# Patient Record
Sex: FEMALE | Race: WHITE | Employment: OTHER | URBAN - NONMETROPOLITAN AREA
[De-identification: names, ages, dates, MRNs, and addresses within clinical notes are randomized per-mention and may not be internally consistent; named-entity substitution may affect disease eponyms.]

---

## 2020-09-26 ENCOUNTER — HOSPITAL ENCOUNTER (INPATIENT)
Age: 85
LOS: 10 days | Discharge: NURSING, REHAB OR LONG-TERM CARE | DRG: 377 | End: 2020-10-06
Attending: HOSPITALIST | Admitting: INTERNAL MEDICINE
Payer: MEDICARE

## 2020-09-26 PROBLEM — K92.2 UPPER GASTROINTESTINAL BLEED: Status: ACTIVE | Noted: 2020-09-26

## 2020-09-26 LAB
ALBUMIN SERPL-MCNC: 3.4 G/DL (ref 3.5–5.2)
ALP BLD-CCNC: 51 U/L (ref 35–104)
ALT SERPL-CCNC: 9 U/L (ref 5–33)
ANION GAP SERPL CALCULATED.3IONS-SCNC: 16 MMOL/L (ref 7–19)
APTT: 33 SEC (ref 26–36.2)
AST SERPL-CCNC: 21 U/L (ref 5–32)
BASOPHILS ABSOLUTE: 0.1 K/UL (ref 0–0.2)
BASOPHILS RELATIVE PERCENT: 0.5 % (ref 0–1)
BILIRUB SERPL-MCNC: 0.4 MG/DL (ref 0.2–1.2)
BUN BLDV-MCNC: 35 MG/DL (ref 8–23)
CALCIUM SERPL-MCNC: 8.7 MG/DL (ref 8.2–9.6)
CHLORIDE BLD-SCNC: 91 MMOL/L (ref 98–111)
CO2: 24 MMOL/L (ref 22–29)
CREAT SERPL-MCNC: 0.6 MG/DL (ref 0.5–0.9)
EOSINOPHILS ABSOLUTE: 0.1 K/UL (ref 0–0.6)
EOSINOPHILS RELATIVE PERCENT: 0.9 % (ref 0–5)
FERRITIN: 353.7 NG/ML (ref 13–150)
GFR AFRICAN AMERICAN: >59
GFR NON-AFRICAN AMERICAN: >60
GLUCOSE BLD-MCNC: 99 MG/DL (ref 74–109)
HAPTOGLOBIN: 146 MG/DL (ref 30–200)
HCT VFR BLD CALC: 27.6 % (ref 37–47)
HEMOGLOBIN: 9.6 G/DL (ref 12–16)
IMMATURE GRANULOCYTES #: 0.1 K/UL
INR BLD: 1.16 (ref 0.88–1.18)
LACTATE DEHYDROGENASE: 219 U/L (ref 91–215)
LACTIC ACID: 1.2 MMOL/L (ref 0.5–1.9)
LYMPHOCYTES ABSOLUTE: 2.5 K/UL (ref 1.1–4.5)
LYMPHOCYTES RELATIVE PERCENT: 19.6 % (ref 20–40)
MAGNESIUM: 1.8 MG/DL (ref 1.7–2.3)
MCH RBC QN AUTO: 31.2 PG (ref 27–31)
MCHC RBC AUTO-ENTMCNC: 34.8 G/DL (ref 33–37)
MCV RBC AUTO: 89.6 FL (ref 81–99)
MONOCYTES ABSOLUTE: 1.3 K/UL (ref 0–0.9)
MONOCYTES RELATIVE PERCENT: 10.6 % (ref 0–10)
NEUTROPHILS ABSOLUTE: 8.6 K/UL (ref 1.5–7.5)
NEUTROPHILS RELATIVE PERCENT: 68 % (ref 50–65)
PDW BLD-RTO: 12.3 % (ref 11.5–14.5)
PHOSPHORUS: 2.4 MG/DL (ref 2.5–4.5)
PLATELET # BLD: 281 K/UL (ref 130–400)
PMV BLD AUTO: 9.7 FL (ref 9.4–12.3)
POTASSIUM REFLEX MAGNESIUM: 4 MMOL/L (ref 3.5–5)
PRO-BNP: 205 PG/ML (ref 0–1800)
PROTHROMBIN TIME: 14.8 SEC (ref 12–14.6)
RBC # BLD: 3.08 M/UL (ref 4.2–5.4)
RETICULOCYTE ABSOLUTE COUNT: 0.05 M/UL (ref 0.03–0.12)
RETICULOCYTE COUNT PCT: 1.61 % (ref 0.5–1.5)
SODIUM BLD-SCNC: 131 MMOL/L (ref 136–145)
TOTAL PROTEIN: 6.7 G/DL (ref 6.6–8.7)
TROPONIN: <0.01 NG/ML (ref 0–0.03)
WBC # BLD: 12.6 K/UL (ref 4.8–10.8)

## 2020-09-26 PROCEDURE — C9113 INJ PANTOPRAZOLE SODIUM, VIA: HCPCS | Performed by: HOSPITALIST

## 2020-09-26 PROCEDURE — 2580000003 HC RX 258: Performed by: HOSPITALIST

## 2020-09-26 PROCEDURE — 6360000002 HC RX W HCPCS: Performed by: HOSPITALIST

## 2020-09-26 PROCEDURE — 2000000000 HC ICU R&B

## 2020-09-26 RX ORDER — ONDANSETRON 4 MG/1
4 TABLET, ORALLY DISINTEGRATING ORAL EVERY 8 HOURS PRN
Status: DISCONTINUED | OUTPATIENT
Start: 2020-09-26 | End: 2020-10-06 | Stop reason: HOSPADM

## 2020-09-26 RX ORDER — MELOXICAM 15 MG/1
15 TABLET ORAL DAILY
Status: ON HOLD | COMMUNITY
End: 2020-10-06 | Stop reason: HOSPADM

## 2020-09-26 RX ORDER — OCTREOTIDE ACETATE 50 UG/ML
50 INJECTION, SOLUTION INTRAVENOUS; SUBCUTANEOUS ONCE
Status: COMPLETED | OUTPATIENT
Start: 2020-09-26 | End: 2020-09-26

## 2020-09-26 RX ORDER — PANTOPRAZOLE SODIUM 40 MG/10ML
40 INJECTION, POWDER, LYOPHILIZED, FOR SOLUTION INTRAVENOUS DAILY
Status: DISCONTINUED | OUTPATIENT
Start: 2020-09-29 | End: 2020-09-29

## 2020-09-26 RX ORDER — ASPIRIN 81 MG/1
81 TABLET ORAL DAILY
Status: ON HOLD | COMMUNITY
End: 2020-10-06 | Stop reason: HOSPADM

## 2020-09-26 RX ORDER — PANTOPRAZOLE SODIUM 40 MG/1
40 TABLET, DELAYED RELEASE ORAL DAILY
Status: ON HOLD | COMMUNITY
End: 2020-10-06 | Stop reason: HOSPADM

## 2020-09-26 RX ORDER — SODIUM CHLORIDE 0.9 % (FLUSH) 0.9 %
10 SYRINGE (ML) INJECTION PRN
Status: DISCONTINUED | OUTPATIENT
Start: 2020-09-26 | End: 2020-10-06 | Stop reason: HOSPADM

## 2020-09-26 RX ORDER — ACETAMINOPHEN 650 MG/1
650 SUPPOSITORY RECTAL EVERY 6 HOURS PRN
Status: DISCONTINUED | OUTPATIENT
Start: 2020-09-26 | End: 2020-10-06 | Stop reason: HOSPADM

## 2020-09-26 RX ORDER — ONDANSETRON 2 MG/ML
4 INJECTION INTRAMUSCULAR; INTRAVENOUS EVERY 6 HOURS PRN
Status: DISCONTINUED | OUTPATIENT
Start: 2020-09-26 | End: 2020-10-06 | Stop reason: HOSPADM

## 2020-09-26 RX ORDER — SODIUM CHLORIDE 9 MG/ML
10 INJECTION INTRAVENOUS DAILY
Status: DISCONTINUED | OUTPATIENT
Start: 2020-09-29 | End: 2020-09-29

## 2020-09-26 RX ORDER — SODIUM CHLORIDE 0.9 % (FLUSH) 0.9 %
10 SYRINGE (ML) INJECTION EVERY 12 HOURS SCHEDULED
Status: DISCONTINUED | OUTPATIENT
Start: 2020-09-26 | End: 2020-10-06 | Stop reason: HOSPADM

## 2020-09-26 RX ORDER — INDAPAMIDE 1.25 MG/1
1.25 TABLET, FILM COATED ORAL NIGHTLY
Status: ON HOLD | COMMUNITY
End: 2020-10-06 | Stop reason: HOSPADM

## 2020-09-26 RX ORDER — ACETAMINOPHEN 325 MG/1
650 TABLET ORAL EVERY 6 HOURS PRN
Status: DISCONTINUED | OUTPATIENT
Start: 2020-09-26 | End: 2020-10-06 | Stop reason: HOSPADM

## 2020-09-26 RX ORDER — BENAZEPRIL HYDROCHLORIDE 20 MG/1
20 TABLET ORAL NIGHTLY
COMMUNITY

## 2020-09-26 RX ADMIN — SODIUM CHLORIDE 8 MG/HR: 9 INJECTION, SOLUTION INTRAVENOUS at 23:37

## 2020-09-26 RX ADMIN — SODIUM CHLORIDE, PRESERVATIVE FREE 10 ML: 5 INJECTION INTRAVENOUS at 23:37

## 2020-09-26 RX ADMIN — OCTREOTIDE ACETATE 50 MCG: 50 INJECTION, SOLUTION INTRAVENOUS; SUBCUTANEOUS at 23:38

## 2020-09-26 RX ADMIN — OCTREOTIDE ACETATE 25 MCG/HR: 500 INJECTION, SOLUTION INTRAVENOUS; SUBCUTANEOUS at 23:37

## 2020-09-26 SDOH — HEALTH STABILITY: MENTAL HEALTH: HOW OFTEN DO YOU HAVE A DRINK CONTAINING ALCOHOL?: NEVER

## 2020-09-26 NOTE — H&P
Patient Information:  Patient: Leona Ness  MRN: [id number]   [place]: [de-identified]  YOB: 1929  Admit Date: 9/26/2020       Primary Care Physician: Edgar York MD  Advance Directive: Full Code   HEALTH CARE PROXY: her son is her [facility] [facility]. PoA, Mr Stephania Lopes, [phone] (cellular) , [phone] (home)        SUBJECTIVE:    EP Sign Out:  Referral from [facility], Dr. Carrie Dhaliwal  Patient: Soledad Daly, 80year old lady  Referral for: Upper GIB needing ICU Care  CC: epigastric pain x weeks  Dark stools x days  Hb was 14.5 earlier this year  Today 10.4  Stools melenic  BUN 41  Cr 0.8  110s over 60s  On protonix GGT  Only med is low-dose ASA    HPI:  Mrs. Elida Nicole is a pleasant 80year old lady with severe hardness of hearing. She had has dark green stools for the last few weeks, but had a melenic stool today, it turned the water red however. She has never had this before. Her children states that they suspect she had an ulcer about 35-40 years ago. She never smoked and rarely drinks, she has been told to drink 2 ounces of red wine for her macular degeneration by her eye doctor but has not been doing this. Her son states that she has barely eaten in the last month, she just has a few bites. She will take at most 1/4 can of soup now or a while egg on some mornings. They had taken her to an OSH a few days ago and they gave her a script for protonix. The PA in the ED apparently stated to them that she might have an \"impaction or a mass in the stomach\" based on her imaging but nothing other than the script was offered for Tx. Has lost 30 pounds over the last year. Review of Systems:   Review of Systems   Constitutional: Positive for appetite change, fatigue and unexpected weight change. Negative for chills, diaphoresis and fever. Respiratory: Negative for chest tightness and shortness of breath. Cardiovascular: Negative for chest pain and palpitations.    Gastrointestinal: Positive for abdominal pain and nausea. Negative for vomiting. Loss of taste   Neurological: Negative for dizziness, syncope and light-headedness. Psychiatric/Behavioral: Positive for confusion.      Past Medical History:   Diagnosis Date    Acid reflux     Arthritis     Hypertension    macular Degeneration - Dry Type  OA of knees    Past Psychiatric History:  Denies any    Past Surgical History:   Procedure Laterality Date    ANKLE SURGERY Right     CARPAL TUNNEL RELEASE      CHOLECYSTECTOMY      JOINT REPLACEMENT      TOTAL HIP ARTHROPLASTY      TUBAL LIGATION       Social History     Tobacco History     Smoking Status  Former Smoker    Smokeless Tobacco Use  Never Used          Alcohol History     Alcohol Use Status  sipof wine once and a while          Drug Use     Drug Use Status  Never          Sexual Activity     Sexually Active  Has 9 kids            CODE STATUS: DNR-CCA  HEALTH CARE PROXY: her [facility] is her [facility]. PoA, Mr Jeannie Betancur, [phone] (cellular) , [phone] (home)  AMBULATES: with walker  DOMICILED: lives alone has a care giver there during the days    Family History   Problem Relation Age of Onset    Diabetes Mother     Cancer Father         prostate metastatic    Cancer Sister         breast    Glaucoma Son     Elevated Lipids Son     Arthritis Daughter     Elevated Lipids Daughter      Allergies:  No Known Allergies    Current Facility-Administered Medications   Medication Dose Route Frequency Provider Last Rate Last Dose    sodium chloride flush 0.9 % injection 10 mL  10 mL Intravenous 2 times per day Mauricio Villanueva MD   10 mL at 09/26/20 3483    sodium chloride flush 0.9 % injection 10 mL  10 mL Intravenous PRN Mauricio Villanueva MD        acetaminophen (TYLENOL) tablet 650 mg  650 mg Oral Q6H PRN Mauricio Villanueva MD        Or    acetaminophen (TYLENOL) suppository 650 mg  650 mg Rectal Q6H PRN Mauricio Villanueva MD        ondansetron (ZOFRAN-ODT) disintegrating acute distress. Appearance: Normal appearance. She is normal weight. She is not ill-appearing or toxic-appearing. HENT:      Head: Normocephalic and atraumatic. Nose: No congestion or rhinorrhea. Eyes:      General:         Right eye: No discharge. Left eye: No discharge. Neck:      Musculoskeletal: Neck supple. Comments: Trachea appears midline  Cardiovascular:      Rate and Rhythm: Normal rate and regular rhythm. Heart sounds: No murmur. No friction rub. No gallop. Pulmonary:      Effort: No respiratory distress. Breath sounds: No stridor. No wheezing, rhonchi or rales. Chest:      Chest wall: No tenderness. Abdominal:      General: Bowel sounds are normal.      Palpations: Abdomen is soft. Tenderness: There is generalized abdominal tenderness. There is no guarding or rebound. Musculoskeletal:      Comments: Decreased muscle mass, decreased fat mass   Skin:     General: Skin is warm. Comments: nondiaphoretic   Neurological:      Mental Status: She is alert. Comments: Oriented to person and place and day of week  But thought it was the 2nd of [email or web address]   Psychiatric:         Mood and Affect: Mood normal.         Behavior: Behavior normal.       LABORATORY DATA:    CBC:   Recent Labs     09/26/20 2314   WBC 12.6*   HGB 9.6*   HCT 27.6*        BMP:   Recent Labs     09/26/20 2314   *   K 4.0   CL 91*   CO2 24   BUN 35*   CREATININE 0.6   CALCIUM 8.7   PHOS 2.4*     Hepatic Profile:   Recent Labs     09/26/20 2314   AST 21   ALT 9   BILITOT 0.4   ALKPHOS 51     Coag Panel:   Recent Labs     09/26/20 2314   INR 1.16   PROTIME 14.8*   APTT 33.0     Cardiac Enzymes:   Recent Labs     09/26/20 2314   TROPONINI <0.01     Pro-BNP:   Recent Labs     09/26/20 2314   PROBNP 205     A1C: No results for input(s): LABA1C in the last 72 hours.     TSH: Invalid input(s): LABTSH    ABG: No results for input(s): PHART, PLX9UHG, PO2ART, ANV4IUY,

## 2020-09-27 ENCOUNTER — ANESTHESIA (OUTPATIENT)
Dept: ENDOSCOPY | Age: 85
DRG: 377 | End: 2020-09-27
Payer: MEDICARE

## 2020-09-27 ENCOUNTER — ANESTHESIA EVENT (OUTPATIENT)
Dept: ENDOSCOPY | Age: 85
DRG: 377 | End: 2020-09-27
Payer: MEDICARE

## 2020-09-27 VITALS — SYSTOLIC BLOOD PRESSURE: 134 MMHG | OXYGEN SATURATION: 92 % | DIASTOLIC BLOOD PRESSURE: 62 MMHG

## 2020-09-27 LAB
ANION GAP SERPL CALCULATED.3IONS-SCNC: 13 MMOL/L (ref 7–19)
BUN BLDV-MCNC: 30 MG/DL (ref 8–23)
CALCIUM SERPL-MCNC: 8.7 MG/DL (ref 8.2–9.6)
CHLORIDE BLD-SCNC: 94 MMOL/L (ref 98–111)
CO2: 25 MMOL/L (ref 22–29)
CREAT SERPL-MCNC: 0.7 MG/DL (ref 0.5–0.9)
GFR AFRICAN AMERICAN: >59
GFR NON-AFRICAN AMERICAN: >60
GLUCOSE BLD-MCNC: 133 MG/DL (ref 74–109)
HCT VFR BLD CALC: 28.7 % (ref 37–47)
HCT VFR BLD CALC: 28.7 % (ref 37–47)
HCT VFR BLD CALC: 28.8 % (ref 37–47)
HCT VFR BLD CALC: 29.9 % (ref 37–47)
HCT VFR BLD CALC: 30.4 % (ref 37–47)
HEMOGLOBIN: 10.1 G/DL (ref 12–16)
HEMOGLOBIN: 10.1 G/DL (ref 12–16)
HEMOGLOBIN: 9.7 G/DL (ref 12–16)
HEMOGLOBIN: 9.8 G/DL (ref 12–16)
HEMOGLOBIN: 9.8 G/DL (ref 12–16)
IRON SATURATION: 32 % (ref 14–50)
IRON: 76 UG/DL (ref 37–145)
MCH RBC QN AUTO: 30.9 PG (ref 27–31)
MCHC RBC AUTO-ENTMCNC: 33.8 G/DL (ref 33–37)
MCV RBC AUTO: 91.4 FL (ref 81–99)
PDW BLD-RTO: 12.3 % (ref 11.5–14.5)
PLATELET # BLD: 284 K/UL (ref 130–400)
PMV BLD AUTO: 9.5 FL (ref 9.4–12.3)
POTASSIUM REFLEX MAGNESIUM: 3.6 MMOL/L (ref 3.5–5)
RBC # BLD: 3.14 M/UL (ref 4.2–5.4)
SARS-COV-2, NAAT: DETECTED
SODIUM BLD-SCNC: 132 MMOL/L (ref 136–145)
TOTAL IRON BINDING CAPACITY: 235 UG/DL (ref 250–400)
WBC # BLD: 11 K/UL (ref 4.8–10.8)

## 2020-09-27 PROCEDURE — 6360000002 HC RX W HCPCS: Performed by: INTERNAL MEDICINE

## 2020-09-27 PROCEDURE — 6370000000 HC RX 637 (ALT 250 FOR IP): Performed by: INTERNAL MEDICINE

## 2020-09-27 PROCEDURE — 83735 ASSAY OF MAGNESIUM: CPT

## 2020-09-27 PROCEDURE — 83615 LACTATE (LD) (LDH) ENZYME: CPT

## 2020-09-27 PROCEDURE — 2580000003 HC RX 258: Performed by: HOSPITALIST

## 2020-09-27 PROCEDURE — 99221 1ST HOSP IP/OBS SF/LOW 40: CPT | Performed by: INTERNAL MEDICINE

## 2020-09-27 PROCEDURE — 83010 ASSAY OF HAPTOGLOBIN QUANT: CPT

## 2020-09-27 PROCEDURE — C9113 INJ PANTOPRAZOLE SODIUM, VIA: HCPCS | Performed by: INTERNAL MEDICINE

## 2020-09-27 PROCEDURE — 85045 AUTOMATED RETICULOCYTE COUNT: CPT

## 2020-09-27 PROCEDURE — 83540 ASSAY OF IRON: CPT

## 2020-09-27 PROCEDURE — 0DJ08ZZ INSPECTION OF UPPER INTESTINAL TRACT, VIA NATURAL OR ARTIFICIAL OPENING ENDOSCOPIC: ICD-10-PCS | Performed by: INTERNAL MEDICINE

## 2020-09-27 PROCEDURE — 80053 COMPREHEN METABOLIC PANEL: CPT

## 2020-09-27 PROCEDURE — 6360000002 HC RX W HCPCS: Performed by: HOSPITALIST

## 2020-09-27 PROCEDURE — 83550 IRON BINDING TEST: CPT

## 2020-09-27 PROCEDURE — 85610 PROTHROMBIN TIME: CPT

## 2020-09-27 PROCEDURE — 2100000000 HC CCU R&B

## 2020-09-27 PROCEDURE — 36415 COLL VENOUS BLD VENIPUNCTURE: CPT

## 2020-09-27 PROCEDURE — 84100 ASSAY OF PHOSPHORUS: CPT

## 2020-09-27 PROCEDURE — U0002 COVID-19 LAB TEST NON-CDC: HCPCS

## 2020-09-27 PROCEDURE — 85018 HEMOGLOBIN: CPT

## 2020-09-27 PROCEDURE — 85014 HEMATOCRIT: CPT

## 2020-09-27 PROCEDURE — 6360000002 HC RX W HCPCS: Performed by: ANESTHESIOLOGY

## 2020-09-27 PROCEDURE — C9113 INJ PANTOPRAZOLE SODIUM, VIA: HCPCS | Performed by: HOSPITALIST

## 2020-09-27 PROCEDURE — 43235 EGD DIAGNOSTIC BRUSH WASH: CPT | Performed by: INTERNAL MEDICINE

## 2020-09-27 PROCEDURE — 84484 ASSAY OF TROPONIN QUANT: CPT

## 2020-09-27 PROCEDURE — 2580000003 HC RX 258: Performed by: INTERNAL MEDICINE

## 2020-09-27 PROCEDURE — 82728 ASSAY OF FERRITIN: CPT

## 2020-09-27 PROCEDURE — 85027 COMPLETE CBC AUTOMATED: CPT

## 2020-09-27 PROCEDURE — 83880 ASSAY OF NATRIURETIC PEPTIDE: CPT

## 2020-09-27 PROCEDURE — 83605 ASSAY OF LACTIC ACID: CPT

## 2020-09-27 PROCEDURE — 2500000003 HC RX 250 WO HCPCS: Performed by: ANESTHESIOLOGY

## 2020-09-27 PROCEDURE — 85025 COMPLETE CBC W/AUTO DIFF WBC: CPT

## 2020-09-27 PROCEDURE — 85730 THROMBOPLASTIN TIME PARTIAL: CPT

## 2020-09-27 RX ORDER — ERGOCALCIFEROL 1.25 MG/1
50000 CAPSULE ORAL WEEKLY
Status: DISCONTINUED | OUTPATIENT
Start: 2020-09-27 | End: 2020-10-06 | Stop reason: HOSPADM

## 2020-09-27 RX ORDER — LIDOCAINE HYDROCHLORIDE 10 MG/ML
INJECTION, SOLUTION INFILTRATION; PERINEURAL PRN
Status: DISCONTINUED | OUTPATIENT
Start: 2020-09-27 | End: 2020-09-29 | Stop reason: SDUPTHER

## 2020-09-27 RX ORDER — SUCRALFATE 1 G/1
1 TABLET ORAL EVERY 6 HOURS SCHEDULED
Status: DISCONTINUED | OUTPATIENT
Start: 2020-09-27 | End: 2020-10-06 | Stop reason: HOSPADM

## 2020-09-27 RX ORDER — METOPROLOL TARTRATE 5 MG/5ML
2.5 INJECTION INTRAVENOUS ONCE
Status: COMPLETED | OUTPATIENT
Start: 2020-09-28 | End: 2020-09-28

## 2020-09-27 RX ORDER — PROPOFOL 10 MG/ML
INJECTION, EMULSION INTRAVENOUS PRN
Status: DISCONTINUED | OUTPATIENT
Start: 2020-09-27 | End: 2020-09-29 | Stop reason: SDUPTHER

## 2020-09-27 RX ORDER — ZINC SULFATE 50(220)MG
50 CAPSULE ORAL DAILY
Status: DISCONTINUED | OUTPATIENT
Start: 2020-09-27 | End: 2020-10-06 | Stop reason: HOSPADM

## 2020-09-27 RX ORDER — CHOLECALCIFEROL (VITAMIN D3) 125 MCG
10 CAPSULE ORAL DAILY
Status: DISCONTINUED | OUTPATIENT
Start: 2020-09-27 | End: 2020-10-06 | Stop reason: HOSPADM

## 2020-09-27 RX ORDER — MONTELUKAST SODIUM 10 MG/1
10 TABLET ORAL NIGHTLY
Status: DISCONTINUED | OUTPATIENT
Start: 2020-09-27 | End: 2020-10-06 | Stop reason: HOSPADM

## 2020-09-27 RX ADMIN — OCTREOTIDE ACETATE 25 MCG/HR: 500 INJECTION, SOLUTION INTRAVENOUS; SUBCUTANEOUS at 19:07

## 2020-09-27 RX ADMIN — PROPOFOL 50 MG: 10 INJECTION, EMULSION INTRAVENOUS at 14:50

## 2020-09-27 RX ADMIN — ERGOCALCIFEROL 50000 UNITS: 1.25 CAPSULE ORAL at 10:53

## 2020-09-27 RX ADMIN — Medication 10 MG: at 10:52

## 2020-09-27 RX ADMIN — LIDOCAINE HYDROCHLORIDE 3 ML: 10 INJECTION, SOLUTION INFILTRATION; PERINEURAL at 14:50

## 2020-09-27 RX ADMIN — AZITHROMYCIN MONOHYDRATE 500 MG: 500 INJECTION, POWDER, LYOPHILIZED, FOR SOLUTION INTRAVENOUS at 14:12

## 2020-09-27 RX ADMIN — SODIUM CHLORIDE 8 MG/HR: 9 INJECTION, SOLUTION INTRAVENOUS at 07:38

## 2020-09-27 RX ADMIN — SUCRALFATE 1 G: 1 TABLET ORAL at 11:55

## 2020-09-27 RX ADMIN — MONTELUKAST SODIUM 10 MG: 10 TABLET, FILM COATED ORAL at 19:07

## 2020-09-27 RX ADMIN — SODIUM CHLORIDE, PRESERVATIVE FREE 10 ML: 5 INJECTION INTRAVENOUS at 07:38

## 2020-09-27 RX ADMIN — OCTREOTIDE ACETATE 25 MCG/HR: 500 INJECTION, SOLUTION INTRAVENOUS; SUBCUTANEOUS at 14:12

## 2020-09-27 RX ADMIN — SODIUM CHLORIDE 8 MG/HR: 9 INJECTION, SOLUTION INTRAVENOUS at 19:07

## 2020-09-27 RX ADMIN — SUCRALFATE 1 G: 1 TABLET ORAL at 17:20

## 2020-09-27 RX ADMIN — Medication 5000 UNITS: at 10:52

## 2020-09-27 RX ADMIN — SODIUM CHLORIDE, PRESERVATIVE FREE 10 ML: 5 INJECTION INTRAVENOUS at 19:09

## 2020-09-27 SDOH — HEALTH STABILITY: MENTAL HEALTH: HOW OFTEN DO YOU HAVE A DRINK CONTAINING ALCOHOL?: NOT ASKED

## 2020-09-27 ASSESSMENT — ENCOUNTER SYMPTOMS
NAUSEA: 0
BACK PAIN: 0
DIARRHEA: 0
CONSTIPATION: 0
NAUSEA: 1
CHEST TIGHTNESS: 0
VOMITING: 0
ABDOMINAL PAIN: 1
COUGH: 0
SHORTNESS OF BREATH: 0

## 2020-09-27 ASSESSMENT — LIFESTYLE VARIABLES: SMOKING_STATUS: 0

## 2020-09-27 NOTE — PROGRESS NOTES
1131 Grazyna Mobley is being assessed upon: Admission    I agree that Madeleine Doll, along with Elly Lee (either 2 RN's or 1 LPN and 1 RN) have performed a thorough Head to Toe Skin Assessment on the patient. ALL assessment sites listed below have been assessed. Areas assessed by both nurses:     [x]   Head, Face, and Ears   [x]   Shoulders, Back, and Chest  [x]   Arms, Elbows, and Hands   [x]   Coccyx, Sacrum, and Ischium  [x]   Legs, Feet, and Heels    Does the Patient have Skin Breakdown? Yes, wound(s) noted upon assessment. It is the responsibility of the Primary Nurse to assure that the following documentation, preventions, orders, and consults are complete on the above noted wound(s): Wound LDA initiated. LDA Flowsheet Documentation includes the Lola-wound, Wound Assessment, Measurements, Dressing Treatment, Drainage, and Color.     Ousmane Prevention initiated: Yes  Wound Care Orders initiated: No    WOC nurse consulted for Pressure Injury (Stage 3,4, Unstageable, DTI, NWPT, and Complex wounds) and New or Established Ostomies: No        Primary Nurse eSignature: Anita Gonzalez RN on 9/26/2020 at 11:54 PM      Co-Signer eSignature: {Esignature:[id number]}

## 2020-09-27 NOTE — PLAN OF CARE
Problem: Falls - Risk of:  Goal: Will remain free from falls  Description: Will remain free from falls  9/27/2020 1556 by Jace Hickman RN  Outcome: Met This Shift  9/27/2020 0641 by Cely Thakur RN  Outcome: Met This Shift  Goal: Absence of physical injury  Description: Absence of physical injury  9/27/2020 1556 by Jace Hickman RN  Outcome: Met This Shift  9/27/2020 0641 by Cely Thakur RN  Outcome: Met This Shift     Problem: Airway Clearance - Ineffective  Goal: Achieve or maintain patent airway  Outcome: Met This Shift     Problem: Gas Exchange - Impaired  Goal: Absence of hypoxia  Outcome: Met This Shift  Goal: Promote optimal lung function  Outcome: Met This Shift     Problem: Breathing Pattern - Ineffective  Goal: Ability to achieve and maintain a regular respiratory rate  Outcome: Met This Shift     Problem:  Body Temperature -  Risk of, Imbalanced  Goal: Ability to maintain a body temperature within defined limits  Outcome: Met This Shift  Goal: Will regain or maintain usual level of consciousness  Outcome: Met This Shift  Goal: Complications related to the disease process, condition or treatment will be avoided or minimized  Outcome: Met This Shift     Problem: Risk for Fluid Volume Deficit  Goal: Maintain normal heart rhythm  Outcome: Met This Shift  Goal: Maintain absence of muscle cramping  Outcome: Met This Shift  Goal: Maintain normal serum potassium, sodium, calcium, phosphorus, and pH  Outcome: Met This Shift     Problem: Skin Integrity:  Goal: Will show no infection signs and symptoms  Description: Will show no infection signs and symptoms  Outcome: Ongoing  Goal: Absence of new skin breakdown  Description: Absence of new skin breakdown  Outcome: Ongoing     Problem: Isolation Precautions - Risk of Spread of Infection  Goal: Prevent transmission of infection  Outcome: Ongoing     Problem: Nutrition Deficits  Goal: Optimize nutrtional status  Outcome: Ongoing     Problem: Loneliness or Risk for Loneliness  Goal: Demonstrate positive use of time alone when socialization is not possible  Outcome: Ongoing     Problem: Fatigue  Goal: Verbalize increase energy and improved vitality  Outcome: Ongoing     Problem: Patient Education: Go to Patient Education Activity  Goal: Patient/Family Education  Outcome: Ongoing

## 2020-09-27 NOTE — CONSULTS
vision, blurred vision. EAR, NOSE, AND THROAT:  Denies ringing in ears, nose bleeds, sore  throat, pain with swallowing. CARDIOVASCULAR:  Denies chest pain, pain with exertion, palpitations,  fainting. RESPIRATORY:  Denies cough, wheezing, shortness of breath, hemoptysis. GASTROINTESTINAL:  Some abdominal discomfort but no specific pain,  melanotic stool, no hematemesis, previous history of ulcer. URINARY:  Denies incontinence, frequency, urgency, nocturia, pain,  discomfort. MUSCULOSKELETAL:  History of arthritis. NEUROLOGICAL:  Denies seizure, focal paralysis, numbness or tingling. HEMATOLOGIC/LYMPHATIC:  Denies known history of anemia, easy bleeding or  bruising, petechia. SKIN:  Denies itching, rashes, nodules, eczema. LABORATORY DATA:  Laboratory studies include an iron of 76 and TIBC of  235. Her COVID-19 is pending. Her sodium is 132, potassium is 3.6. There are no imaging studies on record. PHYSICAL EXAMINATION:  GENERAL:  She is a well-appearing 79-year-old white female. She is  oriented for place and reason for admission but not time. Otherwise,  she can converse well and give a fairly good history. VITAL SIGNS:  Stable with a temperature of 97.8, pulse 80, respirations  22, /83. HEENT:  Head is normocephalic, atraumatic. Pupils are equal, reactive  to light and accommodation. EOMs are intact. Conjunctivae are somewhat  pale. Sclerae nonicteric. NECK:  Supple without thyromegaly. Trachea is in the midline. No  carotid bruits are appreciated. LUNGS:  Clear to auscultation bilaterally. Respiratory excursion is  equal bilaterally. ABDOMEN:  Soft with active bowel sounds. Scaphoid. No masses are  appreciated. No hepatosplenomegaly is noted. There are no specific  areas of tenderness despite her history of melena. EXTREMITIES:  Thin without edema. SKIN:  Warm and dry without rashes. IMPRESSION:  1. Melena with elevated BUN/creatinine ratio.   On Mobic in a  80-year-old, suspect upper GI bleeding (nonsteroidal anti-inflammatory  drug ulcer). 2.  History of arthritis. 3.  Advanced age. PLAN:  1. Agree with IV Protonix drip. 2.  Agree with serial H and Hs.  3.  We will proceed with endoscopy this morning. Thank you for asking me to see the patient.         Brandon Espinal MD    D: 09/27/2020 10:02:28      T: 09/27/2020 10:05:18     [id number]  Job#: [id number]     Doc#: [id number]    CC:

## 2020-09-27 NOTE — ANESTHESIA PRE PROCEDURE
Department of Anesthesiology  Preprocedure Note       Name:  Grandville Sandifer   Age:  80 y.o.  :  1929                                          MRN:  [id number]         Date:  2020      Surgeon: Shell Solis):  Jet Alatorre MD    Procedure: Procedure(s):  egd     Medications prior to admission:   Prior to Admission medications    Medication Sig Start Date End Date Taking?  Authorizing Provider   pantoprazole (PROTONIX) 40 MG tablet Take 40 mg by mouth daily   Yes Historical Provider, MD   meloxicam (MOBIC) 15 MG tablet Take 15 mg by mouth daily   Yes Historical Provider, MD   aspirin 81 MG EC tablet Take 81 mg by mouth daily   Yes Historical Provider, MD   Multiple Vitamins-Minerals (CENTRUM SILVER PO) Take by mouth   Yes Historical Provider, MD   Cholecalciferol (D3-50 PO) Take 25 mg by mouth daily   Yes Historical Provider, MD   benazepril (LOTENSIN) 20 MG tablet Take 20 mg by mouth nightly   Yes Historical Provider, MD   indapamide (LOZOL) 1.25 MG tablet Take 1.25 mg by mouth nightly   Yes Historical Provider, MD       Current medications:    Current Facility-Administered Medications   Medication Dose Route Frequency Provider Last Rate Last Dose    sodium chloride flush 0.9 % injection 10 mL  10 mL Intravenous 2 times per day Giovanny Garcia MD   10 mL at 20 0738    sodium chloride flush 0.9 % injection 10 mL  10 mL Intravenous PRN Giovanny Garcia MD        acetaminophen (TYLENOL) tablet 650 mg  650 mg Oral Q6H PRN Giovanny Garcia MD        Or    acetaminophen (TYLENOL) suppository 650 mg  650 mg Rectal Q6H PRN Giovanny Garcia MD        ondansetron (ZOFRAN-ODT) disintegrating tablet 4 mg  4 mg Oral Q8H PRN Giovanny Garcia MD        Or    ondansetron [facility]) injection 4 mg  4 mg Intravenous Q6H PRN Giovanny Garcia MD        pantoprazole (PROTONIX) 80 mg in sodium chloride 0.9 % 100 mL infusion  8 mg/hr Intravenous Continuous Giovanny Garcia MD 10 mL/hr at 20 0738 8 mg/hr at 20 7881  [START ON 9/29/2020] pantoprazole (PROTONIX) injection 40 mg  40 mg Intravenous Daily Eldon Cordero MD        And   [facility] [START ON 9/29/2020] sodium chloride (PF) 0.9 % injection 10 mL  10 mL Intravenous Daily Eldon Cordero MD        octreotide (SANDOSTATIN) 500 mcg in sodium chloride 0.9 % 100 mL infusion  25 mcg/hr Intravenous Continuous Eldon Cordero MD 5 mL/hr at 09/26/20 2337 25 mcg/hr at 09/26/20 2337       Allergies:  No Known Allergies    Problem List:    Patient Active Problem List   Diagnosis Code    Upper gastrointestinal bleed K92.2       Past Medical History:        Diagnosis Date    Acid reflux     Hypertension     Macular degeneration     Dry Type    OA (osteoarthritis)     knees       Past Surgical History:        Procedure Laterality Date    ANKLE SURGERY Right     CARPAL TUNNEL RELEASE Left     CHOLECYSTECTOMY      TOTAL HIP ARTHROPLASTY Bilateral     TUBAL LIGATION         Social History:    Social History     Tobacco Use    Smoking status: Former Smoker    Smokeless tobacco: Never Used    Tobacco comment: \"smoked a few ciggaerttes once and a while\" as per daughterMegan won't tell that\" as per her son   Substance Use Topics    Alcohol use: Yes     Comment: rarely has a sip of wine                                Counseling given: Not Answered  Comment: \"smoked a few ciggaerttes once and a while\" as per daughterMegan won't tell that\" as per her son      Vital Signs (Current):   Vitals:    09/27/20 0728 09/27/20 0754 09/27/20 0758 09/27/20 0902   BP:  (!) 156/83  (!) 169/75   Pulse: 75 79 80 83   Resp: 15 12 22    Temp:  97.8 °F (36.6 °C)     TempSrc:  Temporal     SpO2: 96% 97% 96% 96%   Weight:       Height:                                                  BP Readings from Last 3 Encounters:   09/27/20 (!) 169/75       NPO Status:                                                                                 BMI:   Wt Readings from Last 3 Encounters:   09/27/20 122 lb (55.3 kg)     Body mass index is 21.61 kg/m². CBC:   Lab Results   Component Value Date    WBC 11.0 09/27/2020    RBC 3.14 09/27/2020    HGB 9.8 09/27/2020    HGB 9.7 09/27/2020    HCT 28.8 09/27/2020    HCT 28.7 09/27/2020    MCV 91.4 09/27/2020    RDW 12.3 09/27/2020     09/27/2020       CMP:   Lab Results   Component Value Date     09/27/2020    K 3.6 09/27/2020    CL 94 09/27/2020    CO2 25 09/27/2020    BUN 30 09/27/2020    CREATININE 0.7 09/27/2020    GFRAA >59 09/27/2020    LABGLOM >60 09/27/2020    GLUCOSE 133 09/27/2020    PROT 6.7 09/26/2020    CALCIUM 8.7 09/27/2020    BILITOT 0.4 09/26/2020    ALKPHOS 51 09/26/2020    AST 21 09/26/2020    ALT 9 09/26/2020       POC Tests: No results for input(s): POCGLU, POCNA, POCK, POCCL, POCBUN, POCHEMO, POCHCT in the last 72 hours.     Coags:   Lab Results   Component Value Date    PROTIME 14.8 09/26/2020    INR 1.16 09/26/2020    APTT 33.0 09/26/2020       HCG (If Applicable): No results found for: PREGTESTUR, PREGSERUM, HCG, HCGQUANT     ABGs: No results found for: PHART, PO2ART, KZS9MPX, BKH0EDB, BEART, C5LSWKEY     Type & Screen (If Applicable):  No results found for: LABABO, LABRH    Drug/Infectious Status (If Applicable):  No results found for: HIV, HEPCAB    COVID-19 Screening (If Applicable):   Lab Results   Component Value Date    COVID19 DETECTED 09/27/2020         Anesthesia Evaluation  Patient summary reviewed and Nursing notes reviewed no history of anesthetic complications:   Airway: Mallampati: II  TM distance: >3 FB   Neck ROM: full  Mouth opening: > = 3 FB Dental:          Pulmonary:normal exam        (-) not a current smoker                           Cardiovascular:    (+) hypertension:,          Beta Blocker:  Not on Beta Blocker         Neuro/Psych:      (-) seizures and CVA           GI/Hepatic/Renal:   (+) GERD:,           Endo/Other:    (+) : arthritis: OA., .                 Abdominal:           Vascular: negative vascular

## 2020-09-27 NOTE — OP NOTE
BRENDACARLOS SOTELO [facility] [address] [facility]                                OPERATIVE REPORT    PATIENT NAME: Virgil Hernandez               :        1929  MED REC NO:   [id number]                              ROOM:       [facility]  ACCOUNT NO:   [de-identified]                           ADMIT DATE: 2020  PROVIDER:     Nicholas Issa MD    DATE OF PROCEDURE:  2020    PROCEDURE:  Endoscopy diagnostic. INDICATIONS:  Melena, on Motrin with elevated BUN creatinine, positive  COVID-19 testing. MEDICATIONS:  See general anesthesia record. OPERATIVE PROCEDURE:  The patient was placed in the left lateral  decubitus position with full cardiopulmonary monitoring and a negative  pressure room with all staff wearing appropriate PPE. The endoscope was  introduced into the oropharynx and advanced into the esophagus by direct  visualization. Esophageal mucosa was normal down to the  gastroesophageal junction. The scope was then advanced into the gastric  cavity, where the gastric mucosa was normal throughout the body and  fundus. The antrum was unremarkable. The duodenal bulb contained a  large duodenal bulb ulcer along the posterior wall reflecting up on the  superior wall. I cannot see the extent of the entire ulcer as the bulb  was difficult to insufflate and probably because of rigidity from the  ulcer. There was no stigmata of recent bleeding. There was some food  particles adherent to the ulcer base. These were gently irrigated. I  could not see a visible vessel clot or any other stigmata. There was no  old or new blood in the GI tract. Scope was passed well into the third  portion of the duodenum and there was no evidence of bleeding. The  scope was then withdrawn back into the stomach. A retroflexion view of  the cardia and fundus was unremarkable. The stomach was then deflated  and the instrument was withdrawn. Est. blood loss .none  IMPRESSION:  Large poster wall duodenal ulcer in the setting of Motrin  usage. PLAN:  Continue PPI drip. Continue CCU monitoring. Add Carafate 1 gm  q.i.d. Serial H and H, transfuse if hemoglobin less than 7.5. If this  large lesion re-bleeds, she may need tertiary care referral for surgical  intervention and/or invasive radiology with embolization. Thank you for asking me to see the patient.         Ghada Lincoln MD    D: 09/27/2020 16:42:40      T: 09/27/2020 16:44:42     [id number]  Job#: [id number]     Doc#: [id number]    CC:

## 2020-09-27 NOTE — PROGRESS NOTES
Hospitalist Progress Note    Patient:  Penny Argueta  YOB: 1929  Date of Service: 9/27/2020  MRN: [id number]   Acct: [de-identified]   Primary Care Physician: Quynh Hatch MD  Advance Directive: Full Code  Admit Date: 9/26/2020       Hospital Day: 1  Referring Provider: Tomi Branch DO    Patient Seen, Chart, Consults, Notes, Labs, Radiology studies reviewed. Subjective:  Penny Argueta is a 80 y.o. female  whom we are following for GI bleed. She was evaluated by gastroenterology, and scheduled for EGD this morning. Her screening COVID test prior to her procedure was unexpectedly positive. Hemodynamics are stable. I will defer to gastroenterology on the urgency of the procedure given her coven status. We will transfer her upstairs to the [facility] unit. She is not displaying any pulmonary symptoms. Allergies:  Patient has no known allergies.     Medicines:  Current Facility-Administered Medications   Medication Dose Route Frequency Provider Last Rate Last Dose    sodium chloride flush 0.9 % injection 10 mL  10 mL Intravenous 2 times per day Rebecca Brito MD   10 mL at 09/27/20 0738    sodium chloride flush 0.9 % injection 10 mL  10 mL Intravenous PRN Rebecca Brito MD        acetaminophen (TYLENOL) tablet 650 mg  650 mg Oral Q6H PRN Rebecca Brito MD        Or    acetaminophen (TYLENOL) suppository 650 mg  650 mg Rectal Q6H PRN Rebecca Brito MD        ondansetron (ZOFRAN-ODT) disintegrating tablet 4 mg  4 mg Oral Q8H PRN Rebecca Brito MD        Or    ondansetron [facility]) injection 4 mg  4 mg Intravenous Q6H PRN Rebecca Brito MD        pantoprazole (PROTONIX) 80 mg in sodium chloride 0.9 % 100 mL infusion  8 mg/hr Intravenous Continuous Rebecca Brito MD 10 mL/hr at 09/27/20 0738 8 mg/hr at 09/27/20 0738    [START ON 9/29/2020] pantoprazole (PROTONIX) injection 40 mg  40 mg Intravenous Daily Rebecca Brito MD        And    [START ON 9/29/2020] sodium chloride (PF) 0.9 % injection 10 mL  10 mL Intravenous Daily Chin Todd MD        octreotide (SANDOSTATIN) 500 mcg in sodium chloride 0.9 % 100 mL infusion  25 mcg/hr Intravenous Continuous Chin Todd MD 5 mL/hr at 09/26/20 2337 25 mcg/hr at 09/26/20 2337       Past Medical History:  Past Medical History:   Diagnosis Date    Acid reflux     Hypertension     Macular degeneration     Dry Type    OA (osteoarthritis)     knees       Past Surgical History:  Past Surgical History:   Procedure Laterality Date    ANKLE SURGERY Right     CARPAL TUNNEL RELEASE Left     CHOLECYSTECTOMY      TOTAL HIP ARTHROPLASTY Bilateral     TUBAL LIGATION         Family History  Family History   Problem Relation Age of Onset    Diabetes Mother     Cancer Father         prostate metastatic    Cancer Sister         breast    Glaucoma Son     Elevated Lipids Son     Arthritis Daughter     Elevated Lipids Daughter        Social History  Social History     Socioeconomic History    Marital status:       Spouse name: Not on file    Number of children: 5    Years of education: Not on file    Highest education level: Not on file   Occupational History    Occupation: house wife   Social Needs    Financial resource strain: Not on file    Food insecurity     Worry: Not on file     Inability: Not on file   [employer] needs     Medical: Not on file     Non-medical: Not on file   Tobacco Use    Smoking status: Former Smoker    Smokeless tobacco: Never Used    Tobacco comment: \"smoked a few ciggaerttes once and a while\" as per daughter, Lizzie Dorantes won't tell that\" as per her son   Substance and Sexual Activity    Alcohol use: Yes     Comment: rarely has a sip of wine    Drug use: Never    Sexual activity: Not on file     Comment: had 9 kids over ten and a half years   Lifestyle    Physical activity     Days per week: Not on file     Minutes per session: Not on file    Stress: Not on file   Relationships    Social connections     Talks on phone: Not on file     Gets together: Not on file     Attends [employer] service: Not on file     Active member of club or organization: Not on file     Attends meetings of clubs or organizations: Not on file     Relationship status: Not on file    Intimate partner violence     Fear of current or ex partner: Not on file     Emotionally abused: Not on file     Physically abused: Not on file     Forced sexual activity: Not on file   Other Topics Concern    Not on file   Social History Narrative    CODE STATUS: DNR-CCA    HEALTH CARE PROXY: her son is her [address], Mr Dirk Mann, [phone] (cellular) , [phone] (home)    AMBULATES: with walker    DOMICILED: lives alone has a care giver there during the days         Review of Systems:    Review of Systems   Constitutional: Negative for activity change and fatigue. Respiratory: Negative for cough and shortness of breath. Cardiovascular: Negative for chest pain and leg swelling. Gastrointestinal: Negative for constipation, diarrhea, nausea and vomiting. Genitourinary: Negative for difficulty urinating and dysuria. Musculoskeletal: Negative for arthralgias and back pain. Neurological: Negative for dizziness and headaches. Objective:  Blood pressure (!) 169/75, pulse 83, temperature 97.8 °F (36.6 °C), temperature source Temporal, resp. rate 22, height 5' 3\" (1.6 m), weight 122 lb (55.3 kg), SpO2 96 %. Intake/Output Summary (Last 24 hours) at 9/27/2020 1004  Last data filed at 9/27/2020 0758  Gross per 24 hour   Intake 113.5 ml   Output 900 ml   Net -786.5 ml       Physical Exam  Vitals signs reviewed. Constitutional:       Appearance: She is well-developed. HENT:      Head: Normocephalic and atraumatic. Eyes:      Conjunctiva/sclera: Conjunctivae normal.      Pupils: Pupils are equal, round, and reactive to light. Cardiovascular:      Rate and Rhythm: Normal rate and regular rhythm.       Heart sounds: Normal heart sounds. Pulmonary:      Effort: Pulmonary effort is normal.      Breath sounds: Normal breath sounds. Abdominal:      Palpations: Abdomen is soft. Musculoskeletal: Normal range of motion. Skin:     General: Skin is warm and dry. Neurological:      Mental Status: She is alert and oriented to person, place, and time. Labs:  BMP:   Recent Labs     09/26/20 2314 09/27/20  0455   * 132*   K 4.0 3.6   CL 91* 94*   CO2 24 25   PHOS 2.4*  --    BUN 35* 30*   CREATININE 0.6 0.7   CALCIUM 8.7 8.7     CBC:   Recent Labs     09/26/20 2314 09/27/20  0455   WBC 12.6* 11.0*   HGB 9.6* 9.7*  9.8*   HCT 27.6* 28.7*  28.8*   MCV 89.6 91.4    284     LIVER PROFILE:   Recent Labs     09/26/20 2314   AST 21   ALT 9   BILITOT 0.4   ALKPHOS 51     PT/INR:   Recent Labs     09/26/20 2314   PROTIME 14.8*   INR 1.16     APTT:   Recent Labs     09/26/20 2314   APTT 33.0     BNP:  No results for input(s): BNP in the last 72 hours. Ionized Calcium:No results for input(s): IONCA in the last 72 hours. Magnesium:  Recent Labs     09/26/20 2314   MG 1.8     Phosphorus:  Recent Labs     09/26/20 2314   PHOS 2.4*     HgbA1C: No results for input(s): LABA1C in the last 72 hours. Hepatic:   Recent Labs     09/26/20 2314   ALKPHOS 51   ALT 9   AST 21   PROT 6.7   BILITOT 0.4   LABALBU 3.4*     Lactic Acid:   Recent Labs     09/26/20 2314   LACTA 1.2     Troponin: No results for input(s): CKTOTAL, CKMB, TROPONINT in the last 72 hours. ABGs: No results for input(s): PH, PCO2, PO2, HCO3, O2SAT in the last 72 hours. CRP:  No results for input(s): CRP in the last 72 hours. Sed Rate:  No results for input(s): SEDRATE in the last 72 hours. Cultures:   No results for input(s): CULTURE in the last 72 hours. No results for input(s): BCYessy in the last 72 hours. No results for input(s): CXSURG in the last 72 hours. Radiology reports as per the Radiologist  Radiology: No results found. Assessment     Upper gastrointestinal bleed. Continue PPI and octreotide. COVID-19 infection. No pulmonary symptoms at present. Hold off on intervention with dexamethasone and remdesivir at this time. Transfer to the [facility] unit.       Lilliana Hamilton,

## 2020-09-28 PROBLEM — Z51.5 PALLIATIVE CARE PATIENT: Status: ACTIVE | Noted: 2020-09-28

## 2020-09-28 PROBLEM — E44.0 MODERATE MALNUTRITION (HCC): Status: ACTIVE | Noted: 2020-09-28

## 2020-09-28 LAB
ANION GAP SERPL CALCULATED.3IONS-SCNC: 12 MMOL/L (ref 7–19)
BUN BLDV-MCNC: 10 MG/DL (ref 8–23)
CALCIUM SERPL-MCNC: 8.7 MG/DL (ref 8.2–9.6)
CHLORIDE BLD-SCNC: 93 MMOL/L (ref 98–111)
CO2: 25 MMOL/L (ref 22–29)
CREAT SERPL-MCNC: 0.6 MG/DL (ref 0.5–0.9)
GFR AFRICAN AMERICAN: >59
GFR NON-AFRICAN AMERICAN: >60
GLUCOSE BLD-MCNC: 123 MG/DL (ref 74–109)
GLUCOSE BLD-MCNC: 150 MG/DL (ref 70–99)
HCT VFR BLD CALC: 26.4 % (ref 37–47)
HCT VFR BLD CALC: 27.3 % (ref 37–47)
HCT VFR BLD CALC: 28.3 % (ref 37–47)
HEMOGLOBIN: 9 G/DL (ref 12–16)
HEMOGLOBIN: 9.2 G/DL (ref 12–16)
HEMOGLOBIN: 9.3 G/DL (ref 12–16)
MAGNESIUM: 1.5 MG/DL (ref 1.7–2.3)
MCH RBC QN AUTO: 30.6 PG (ref 27–31)
MCHC RBC AUTO-ENTMCNC: 32.9 G/DL (ref 33–37)
MCV RBC AUTO: 93.1 FL (ref 81–99)
PDW BLD-RTO: 12.5 % (ref 11.5–14.5)
PERFORMED ON: ABNORMAL
PLATELET # BLD: 270 K/UL (ref 130–400)
PMV BLD AUTO: 10 FL (ref 9.4–12.3)
POTASSIUM REFLEX MAGNESIUM: 2.9 MMOL/L (ref 3.5–5)
RBC # BLD: 3.04 M/UL (ref 4.2–5.4)
REASON FOR REJECTION: NORMAL
REASON FOR REJECTION: NORMAL
REJECTED TEST: NORMAL
REJECTED TEST: NORMAL
SODIUM BLD-SCNC: 130 MMOL/L (ref 136–145)
WBC # BLD: 9.5 K/UL (ref 4.8–10.8)

## 2020-09-28 PROCEDURE — 6370000000 HC RX 637 (ALT 250 FOR IP): Performed by: INTERNAL MEDICINE

## 2020-09-28 PROCEDURE — 6360000002 HC RX W HCPCS: Performed by: INTERNAL MEDICINE

## 2020-09-28 PROCEDURE — 2580000003 HC RX 258: Performed by: INTERNAL MEDICINE

## 2020-09-28 PROCEDURE — 85027 COMPLETE CBC AUTOMATED: CPT

## 2020-09-28 PROCEDURE — 82947 ASSAY GLUCOSE BLOOD QUANT: CPT

## 2020-09-28 PROCEDURE — 80048 BASIC METABOLIC PNL TOTAL CA: CPT

## 2020-09-28 PROCEDURE — 2500000003 HC RX 250 WO HCPCS: Performed by: HOSPITALIST

## 2020-09-28 PROCEDURE — C9113 INJ PANTOPRAZOLE SODIUM, VIA: HCPCS | Performed by: INTERNAL MEDICINE

## 2020-09-28 PROCEDURE — 2100000000 HC CCU R&B

## 2020-09-28 PROCEDURE — 85014 HEMATOCRIT: CPT

## 2020-09-28 PROCEDURE — 6360000002 HC RX W HCPCS: Performed by: HOSPITALIST

## 2020-09-28 PROCEDURE — 83735 ASSAY OF MAGNESIUM: CPT

## 2020-09-28 PROCEDURE — 85018 HEMOGLOBIN: CPT

## 2020-09-28 PROCEDURE — 6370000000 HC RX 637 (ALT 250 FOR IP): Performed by: HOSPITALIST

## 2020-09-28 RX ORDER — AMLODIPINE BESYLATE 5 MG/1
5 TABLET ORAL DAILY
Status: DISCONTINUED | OUTPATIENT
Start: 2020-09-28 | End: 2020-10-06 | Stop reason: HOSPADM

## 2020-09-28 RX ORDER — POTASSIUM CHLORIDE 20 MEQ/1
40 TABLET, EXTENDED RELEASE ORAL ONCE
Status: COMPLETED | OUTPATIENT
Start: 2020-09-28 | End: 2020-09-28

## 2020-09-28 RX ORDER — POTASSIUM CHLORIDE 7.45 MG/ML
10 INJECTION INTRAVENOUS
Status: DISPENSED | OUTPATIENT
Start: 2020-09-28 | End: 2020-09-28

## 2020-09-28 RX ORDER — MAGNESIUM SULFATE 1 G/100ML
1 INJECTION INTRAVENOUS ONCE
Status: COMPLETED | OUTPATIENT
Start: 2020-09-28 | End: 2020-09-28

## 2020-09-28 RX ADMIN — SODIUM CHLORIDE 8 MG/HR: 9 INJECTION, SOLUTION INTRAVENOUS at 15:41

## 2020-09-28 RX ADMIN — SUCRALFATE 1 G: 1 TABLET ORAL at 23:07

## 2020-09-28 RX ADMIN — AZITHROMYCIN MONOHYDRATE 500 MG: 500 INJECTION, POWDER, LYOPHILIZED, FOR SOLUTION INTRAVENOUS at 15:41

## 2020-09-28 RX ADMIN — MONTELUKAST SODIUM 10 MG: 10 TABLET, FILM COATED ORAL at 20:28

## 2020-09-28 RX ADMIN — SUCRALFATE 1 G: 1 TABLET ORAL at 17:57

## 2020-09-28 RX ADMIN — SUCRALFATE 1 G: 1 TABLET ORAL at 01:13

## 2020-09-28 RX ADMIN — Medication 5000 UNITS: at 10:32

## 2020-09-28 RX ADMIN — METOPROLOL TARTRATE 2.5 MG: 5 INJECTION, SOLUTION INTRAVENOUS at 00:00

## 2020-09-28 RX ADMIN — SUCRALFATE 1 G: 1 TABLET ORAL at 12:36

## 2020-09-28 RX ADMIN — MAGNESIUM SULFATE HEPTAHYDRATE 1 G: 1 INJECTION, SOLUTION INTRAVENOUS at 20:28

## 2020-09-28 RX ADMIN — ZINC SULFATE 220 MG (50 MG) CAPSULE 50 MG: CAPSULE at 10:34

## 2020-09-28 RX ADMIN — SODIUM CHLORIDE 500 MG: 9 INJECTION, SOLUTION INTRAVENOUS at 17:56

## 2020-09-28 RX ADMIN — POTASSIUM CHLORIDE 40 MEQ: 1500 TABLET, EXTENDED RELEASE ORAL at 23:07

## 2020-09-28 RX ADMIN — AMLODIPINE BESYLATE 5 MG: 5 TABLET ORAL at 10:34

## 2020-09-28 RX ADMIN — POTASSIUM CHLORIDE 40 MEQ: 1500 TABLET, EXTENDED RELEASE ORAL at 20:00

## 2020-09-28 RX ADMIN — SUCRALFATE 1 G: 1 TABLET ORAL at 05:07

## 2020-09-28 RX ADMIN — Medication 10 MG: at 10:33

## 2020-09-28 RX ADMIN — POTASSIUM CHLORIDE 10 MEQ: 7.46 INJECTION, SOLUTION INTRAVENOUS at 20:26

## 2020-09-28 ASSESSMENT — ENCOUNTER SYMPTOMS
NAUSEA: 0
DIARRHEA: 0
BLOOD IN STOOL: 0
BACK PAIN: 0
COUGH: 0
CONSTIPATION: 0
VOMITING: 0
SHORTNESS OF BREATH: 0

## 2020-09-28 NOTE — PLAN OF CARE
Problem: Nutrition  Goal: Optimal nutrition therapy  Outcome: Ongoing   Nutrition Problem #1: Inadequate energy intake  Intervention: Food and/or Nutrient Delivery: Continue NPO  Nutritional Goals: Meet nutrient needs through oral diet with PO intake >50%

## 2020-09-28 NOTE — PROGRESS NOTES
Pt continues to demonstrate impulsive behavior by repeatedly attempting to get out of bed. Pt strongly encouraged to use the call light for her own personal safety and to not get out of bed unassisted. Pt verbalizes understanding at this time.

## 2020-09-28 NOTE — PROGRESS NOTES
Comprehensive Nutrition Assessment    Type and Reason for Visit:  Initial, Positive Nutrition Screen    Nutrition Recommendations/Plan: Follow for diet advancement    Nutrition Assessment:  Positive nutrition screen for wt loss, poor appetite, and stg I wound to buttocks. Pt presents with moderate malnutrition AEB decreased PO intake and wt loss. Pt is at risk for further nutritional compromise r/t increased nutrient needs and NPO status. Per notes, pt reports a 30# wt loss over the past year. Pt is NPO at this time. Will follow for diet advancement and implement intervention as needed. Malnutrition Assessment:  Malnutrition Status: Moderate malnutrition    Context:  Chronic Illness     Findings of the 6 clinical characteristics of malnutrition:  Energy Intake:  7 - 75% or less estimated energy requirements for 1 month or longer  Weight Loss:  7 - 20% over 1 year     Body Fat Loss:  Unable to assess     Muscle Mass Loss:  Unable to assess    Fluid Accumulation:  No significant fluid accumulation     Strength:  Not Performed    Estimated Daily Nutrient Needs:  Energy (kcal):  1344-0181; Weight Used for Energy Requirements:  Current     Protein (g):  69-83; Weight Used for Protein Requirements:  Current(1.25-1. 5)        Fluid (ml/day):  3712-2823; Weight Used for Fluid Requirements:  Current      Wounds:  Stage I       Current Nutrition Therapies:    Diet NPO Effective Now Exceptions are: Sips with Meds, Ice Chips    Anthropometric Measures:  · Height: 5' 3\" (160 cm)  · Current Body Weight: 122 lb (55.3 kg)    · Ideal Body Weight: 115 lbs; % Ideal Body Weight 106.1 %   · BMI: 21.6  · BMI Categories: Underweight (BMI less than 22) age over 72       Nutrition Diagnosis:   · Inadequate energy intake related to early satiety, acute injury/trauma as evidenced by poor intake prior to admission, weight loss      Nutrition Interventions:   Food and/or Nutrient Delivery:  Continue NPO  Nutrition Education/Counseling:  No recommendation at this time   Coordination of Nutrition Care:  Continued Inpatient Monitoring    Goals:  Meet nutrient needs through oral diet with PO intake >50%       Nutrition Monitoring and Evaluation:   Food/Nutrient Intake Outcomes:  Food and Nutrient Intake, Diet Advancement/Tolerance  Physical Signs/Symptoms Outcomes:  Biochemical Data, Skin, Weight     Discharge Planning:     Too soon to determine     Electronically signed by Darius Moran RD, LD on 9/28/20 at 9:54 AM CDT    Contact: [phone]

## 2020-09-28 NOTE — PROGRESS NOTES
Hospitalist Progress Note    Patient:  Tracy Julian  YOB: 1929  Date of Service: 9/28/2020  MRN: [id number]   Acct: [de-identified]   Primary Care Physician: Neelima Jauregui MD  Advance Directive: Full Code  Admit Date: 9/26/2020       Hospital Day: 2  Referring Provider: Sean Cramer DO    Patient Seen, Chart, Consults, Notes, Labs, Radiology studies reviewed. Subjective:  Tracy Julian is a 80 y.o. female  whom we are following for GI bleed. She underwent EGD yesterday, and was shown to have a large duodenal ulcer. There was no sign of active bleeding. She is not displaying any symptoms from the standpoint of COVID. She is currently on room air with a saturation of 96%. Her hemoglobin has remained stable at 9.2 g.    Allergies:  Patient has no known allergies.     Medicines:  Current Facility-Administered Medications   Medication Dose Route Frequency Provider Last Rate Last Dose    amLODIPine (NORVASC) tablet 5 mg  5 mg Oral Daily Sean Kings, DO   5 mg at 09/28/20 1034    melatonin tablet 10 mg  10 mg Oral Daily Sean Kings, DO   10 mg at 09/28/20 1033    montelukast (SINGULAIR) tablet 10 mg  10 mg Oral Nightly Sean Kings, DO   10 mg at 09/27/20 1907    vitamin D (CHOLECALCIFEROL) capsule 5,000 Units  5,000 Units Oral Daily Sean Kings, DO   5,000 Units at 09/28/20 1032    vitamin D (ERGOCALCIFEROL) capsule 50,000 Units  50,000 Units Oral Weekly Sean Kings, DO   50,000 Units at 09/27/20 1053    zinc sulfate (ZINCATE) capsule 50 mg  50 mg Oral Daily Sean Kings, DO   50 mg at 09/28/20 1034    azithromycin (ZITHROMAX) 500 mg in D5W 250ml Vial Mate  500 mg Intravenous Q24H Sean Kings, DO   Stopped at 09/27/20 1513    sucralfate (CARAFATE) tablet 1 g  1 g Oral 4 times per day Aung Dong MD   1 g at 09/28/20 1236    sodium chloride flush 0.9 % injection 10 mL  10 mL Intravenous 2 times per day Ian Lucio Kennedy, DO   10 mL at 09/27/20 1909    sodium chloride flush 0.9 % injection 10 mL  10 mL Intravenous PRN Nathaly Alosa, DO        acetaminophen (TYLENOL) tablet 650 mg  650 mg Oral Q6H PRN Nathaly Alosa, DO        Or    acetaminophen (TYLENOL) suppository 650 mg  650 mg Rectal Q6H PRN Nathaly Alosa, DO        ondansetron (ZOFRAN-ODT) disintegrating tablet 4 mg  4 mg Oral Q8H PRN Nathaly Alosa, DO        Or    [facility] injection 4 mg  4 mg Intravenous Q6H PRN Nathaly Alosa, DO        pantoprazole (PROTONIX) 80 mg in sodium chloride 0.9 % 100 mL infusion  8 mg/hr Intravenous Continuous Nathaly Alosa, DO 10 mL/hr at 09/27/20 1907 8 mg/hr at 09/27/20 1907    [START ON 9/29/2020] pantoprazole (PROTONIX) injection 40 mg  40 mg Intravenous Daily Nathaly Alosa, DO        And    [START ON 9/29/2020] sodium chloride (PF) 0.9 % injection 10 mL  10 mL Intravenous Daily Nathaly Alosa, DO        octreotide (SANDOSTATIN) 500 mcg in sodium chloride 0.9 % 100 mL infusion  25 mcg/hr Intravenous Continuous Nathaly Alosa, DO 5 mL/hr at 09/27/20 1907 25 mcg/hr at 09/27/20 1907     Facility-Administered Medications Ordered in Other Encounters   Medication Dose Route Frequency Provider Last Rate Last Dose    propofol injection    PRN Giorgi Rendon MD   50 mg at 09/27/20 1450    lidocaine 1 % injection    PRN Giorgi Rendon MD   3 mL at 09/27/20 1450       Past Medical History:  Past Medical History:   Diagnosis Date    Acid reflux     Hypertension     Macular degeneration     Dry Type    OA (osteoarthritis)     knees    Palliative care patient 09/28/2020       Past Surgical History:  Past Surgical History:   Procedure Laterality Date    ANKLE SURGERY Right     CARPAL TUNNEL RELEASE Left     CHOLECYSTECTOMY      TOTAL HIP ARTHROPLASTY Bilateral     TUBAL LIGATION      UPPER GASTROINTESTINAL ENDOSCOPY  09/28/2020    Dr Huong Phillips poster results for input(s): IONCA in the last 72 hours. Magnesium:  Recent Labs     09/26/20  2314 09/28/20  1420   MG 1.8 1.5*     Phosphorus:  Recent Labs     09/26/20 2314   PHOS 2.4*     HgbA1C: No results for input(s): LABA1C in the last 72 hours. Hepatic:   Recent Labs     09/26/20  2314   ALKPHOS 51   ALT 9   AST 21   PROT 6.7   BILITOT 0.4   LABALBU 3.4*     Lactic Acid:   Recent Labs     09/26/20  2314   LACTA 1.2     Troponin: No results for input(s): CKTOTAL, CKMB, TROPONINT in the last 72 hours. ABGs: No results for input(s): PH, PCO2, PO2, HCO3, O2SAT in the last 72 hours. CRP:  No results for input(s): CRP in the last 72 hours. Sed Rate:  No results for input(s): SEDRATE in the last 72 hours. Cultures:   No results for input(s): CULTURE in the last 72 hours. No results for input(s): BC, Bharathi Plan in the last 72 hours. No results for input(s): CXSURG in the last 72 hours. Radiology reports as per the Radiologist  Radiology: No results found. Assessment     Upper gastrointestinal bleed secondary to a large posterior duodenal ulcer. Continue PPI.     COVID-19 infection. No pulmonary symptoms at present. Hold off on intervention with dexamethasone and remdesivir at this time. Moderate malnutrition. Nutritional support.       Noble Bello DO

## 2020-09-28 NOTE — PLAN OF CARE
Problem: Falls - Risk of:  Goal: Will remain free from falls  Description: Will remain free from falls  9/27/2020 2312 by Renetta Sorto RN  Outcome: Ongoing  9/27/2020 1556 by Sruthi Arias RN  Outcome: Met This Shift  Goal: Absence of physical injury  Description: Absence of physical injury  9/27/2020 2312 by Renetta Sorto RN  Outcome: Ongoing  9/27/2020 1556 by Sruthi Arias RN  Outcome: Met This Shift     Problem: Skin Integrity:  Goal: Will show no infection signs and symptoms  Description: Will show no infection signs and symptoms  9/27/2020 2312 by Renetta Sorto RN  Outcome: Ongoing  9/27/2020 1556 by Sruthi Arias RN  Outcome: Ongoing  Goal: Absence of new skin breakdown  Description: Absence of new skin breakdown  9/27/2020 2312 by Renetta Sorto RN  Outcome: Ongoing  9/27/2020 1556 by Sruthi Arias RN  Outcome: Ongoing     Problem: Airway Clearance - Ineffective  Goal: Achieve or maintain patent airway  9/27/2020 2312 by Renetta Sorto RN  Outcome: Ongoing  9/27/2020 1556 by Sruthi Arias RN  Outcome: Met This Shift

## 2020-09-28 NOTE — PROGRESS NOTES
Upon arrival pt was sitting up in bed and pulling at IV tubing. Explained to pt the need to maintain integrity of IV placement due to medications being received for her condition. Pt assisted to bed. Pt expressed no needs at this time. Bed alarm checked.

## 2020-09-28 NOTE — ACP (ADVANCE CARE PLANNING)
Advance Care Planning     Advance Care Planning Activator (Inpatient)  Conversation Note      Date of ACP Conversation: 9/28/1010    Conversation Conducted with: Patient's son Silvia Felix    ACP Activator: Dariel Vizcaino      [facility] Decision Maker:   Primary Decision Maker: José Miguel Call Child - [phone]    Secondary Decision Maker: Samreen Grajeda - Child      Care Preferences    Ventilation: \"If you were in your present state of health and suddenly became very ill and were unable to breathe on your own, what would your preference be about the use of a ventilator (breathing machine) if it were available to you? \"      Would the patient desire the use of ventilator (breathing machine)?: No    \"If your health worsens and it becomes clear that your chance of recovery is unlikely, what would your preference be about the use of a ventilator (breathing machine) if it were available to you? \"     Would the patient desire the use of ventilator (breathing machine)?: No      Resuscitation  \"CPR works best to restart the heart when there is a sudden event, like a heart attack, in someone who is otherwise healthy. Unfortunately, CPR does not typically restart the heart for people who have serious health conditions or who are very sick. \"    \"In the event your heart stopped as a result of an underlying serious health condition, would you want attempts to be made to restart your heart (answer \"yes\" for attempt to resuscitate) or would you prefer a natural death (answer \"no\" for do not attempt to resuscitate)? \" No       [] Yes   [x] No   Educated Patient / Juan C Story regarding differences between Advance Directives and portable DNR orders.        Conversation Outcomes:  [x] ACP discussion completed    Electronically signed by Dariel Vizcaino on 9/28/2020 at 1:10 PM

## 2020-09-28 NOTE — PROGRESS NOTES
Palliative Care/Spiritual Care: Spoke with pt's son Abner Gomez to initiate Palliative care. Pt's son says pt has a GI bleed, Covid positive, and is a DNR. Advance Directives: Pt has a POA, her son Abner Gomez is primary decision maker and another son Key Guzman is secondary. Notified Stella Yang, pt's nurse about wishes of pt and POA for pt to be a DNR. SEE ACP NOTE. Pain/other symptoms: Unable to speak with pt to discuss. Social/Spiritual: Pt is Buddhism and attends [employer] license of [employer] [facility]. Pt/family discussion r/t goals: Pt has three sons, and grandchildren. Pt lives at home according to son but requires someone with her during the day. Pt sleeps at home alone. Pt ambulates with a walker but requires assistance with most daily living skills. Pt is able to use her walker and feed herself; pt's family prepares meals, and helps her with bathing. Pt is now Covid positive. Goal is for pt to return home if she is able. Provided spiritual care with sustaining presence, nurtured hope, and prayer. Pt expressed gratitude for spiritual care.     Electronically signed by Sujey Macias on 9/28/2020 at 1:04 PM

## 2020-09-28 NOTE — PLAN OF CARE
Problem: Falls - Risk of:  Goal: Will remain free from falls  Outcome: Ongoing  Goal: Absence of physical injury  Outcome: Ongoing     Problem: Skin Integrity:  Goal: Will show no infection signs and symptoms  Outcome: Ongoing  Goal: Absence of new skin breakdown  Outcome: Ongoing     Problem: Airway Clearance - Ineffective  Goal: Achieve or maintain patent airway  Outcome: Ongoing     Problem: Gas Exchange - Impaired  Goal: Absence of hypoxia  Outcome: Ongoing  Goal: Promote optimal lung function  Outcome: Ongoing     Problem: Breathing Pattern - Ineffective  Goal: Ability to achieve and maintain a regular respiratory rate  Outcome: Ongoing     Problem:  Body Temperature -  Risk of, Imbalanced  Goal: Ability to maintain a body temperature within defined limits  Outcome: Ongoing  Goal: Will regain or maintain usual level of consciousness  Outcome: Ongoing  Goal: Complications related to the disease process, condition or treatment will be avoided or minimized  Outcome: Ongoing     Problem: Isolation Precautions - Risk of Spread of Infection  Goal: Prevent transmission of infection  Outcome: Ongoing     Problem: Nutrition Deficits  Goal: Optimize nutrtional status  Outcome: Ongoing     Problem: Risk for Fluid Volume Deficit  Goal: Maintain normal heart rhythm  Outcome: Ongoing  Goal: Maintain absence of muscle cramping  Outcome: Ongoing  Goal: Maintain normal serum potassium, sodium, calcium, phosphorus, and pH  Outcome: Ongoing     Problem: Loneliness or Risk for Loneliness  Goal: Demonstrate positive use of time alone when socialization is not possible  Outcome: Ongoing     Problem: Fatigue  Goal: Verbalize increase energy and improved vitality  Outcome: Ongoing     Problem: Patient Education: Go to Patient Education Activity  Goal: Patient/Family Education  Outcome: Ongoing     Problem: Nutrition  Goal: Optimal nutrition therapy  9/28/2020 1714 by Jeanette Buck RN  Outcome: Ongoing  9/28/2020 0955 by Tameka De La Fuente IGLESIA, ISAURA  Outcome: Ongoing

## 2020-09-28 NOTE — PROGRESS NOTES
Explained to patient the importance of telling nurses all their needs while in the room, and not waiting till nurse is out of the room and undressed to call nurse back in. Patient verbalized understanding.

## 2020-09-29 LAB
ANION GAP SERPL CALCULATED.3IONS-SCNC: 15 MMOL/L (ref 7–19)
BUN BLDV-MCNC: 9 MG/DL (ref 8–23)
CALCIUM SERPL-MCNC: 9 MG/DL (ref 8.2–9.6)
CHLORIDE BLD-SCNC: 91 MMOL/L (ref 98–111)
CO2: 23 MMOL/L (ref 22–29)
CREAT SERPL-MCNC: 0.6 MG/DL (ref 0.5–0.9)
GFR AFRICAN AMERICAN: >59
GFR NON-AFRICAN AMERICAN: >60
GLUCOSE BLD-MCNC: 119 MG/DL (ref 74–109)
HCT VFR BLD CALC: 35.3 % (ref 37–47)
HEMOGLOBIN: 12.1 G/DL (ref 12–16)
POTASSIUM REFLEX MAGNESIUM: 4.5 MMOL/L (ref 3.5–5)
REASON FOR REJECTION: NORMAL
REJECTED TEST: NORMAL
SODIUM BLD-SCNC: 129 MMOL/L (ref 136–145)

## 2020-09-29 PROCEDURE — 6360000002 HC RX W HCPCS: Performed by: INTERNAL MEDICINE

## 2020-09-29 PROCEDURE — 6370000000 HC RX 637 (ALT 250 FOR IP): Performed by: INTERNAL MEDICINE

## 2020-09-29 PROCEDURE — 2580000003 HC RX 258: Performed by: INTERNAL MEDICINE

## 2020-09-29 PROCEDURE — 80048 BASIC METABOLIC PNL TOTAL CA: CPT

## 2020-09-29 PROCEDURE — 2100000000 HC CCU R&B

## 2020-09-29 PROCEDURE — 6360000002 HC RX W HCPCS: Performed by: HOSPITALIST

## 2020-09-29 PROCEDURE — C9113 INJ PANTOPRAZOLE SODIUM, VIA: HCPCS | Performed by: INTERNAL MEDICINE

## 2020-09-29 PROCEDURE — 85014 HEMATOCRIT: CPT

## 2020-09-29 PROCEDURE — 85018 HEMOGLOBIN: CPT

## 2020-09-29 RX ORDER — PANTOPRAZOLE SODIUM 40 MG/1
40 TABLET, DELAYED RELEASE ORAL
Status: DISCONTINUED | OUTPATIENT
Start: 2020-09-29 | End: 2020-10-06 | Stop reason: HOSPADM

## 2020-09-29 RX ORDER — FAMOTIDINE 20 MG/1
20 TABLET, FILM COATED ORAL 2 TIMES DAILY
Status: DISCONTINUED | OUTPATIENT
Start: 2020-09-29 | End: 2020-09-30

## 2020-09-29 RX ORDER — POTASSIUM CHLORIDE 7.45 MG/ML
10 INJECTION INTRAVENOUS ONCE
Status: COMPLETED | OUTPATIENT
Start: 2020-09-29 | End: 2020-09-29

## 2020-09-29 RX ORDER — LOPERAMIDE HYDROCHLORIDE 2 MG/1
4 CAPSULE ORAL 4 TIMES DAILY PRN
Status: DISCONTINUED | OUTPATIENT
Start: 2020-09-29 | End: 2020-10-06 | Stop reason: HOSPADM

## 2020-09-29 RX ADMIN — PANTOPRAZOLE SODIUM 40 MG: 40 TABLET, DELAYED RELEASE ORAL at 15:41

## 2020-09-29 RX ADMIN — Medication 5000 UNITS: at 08:40

## 2020-09-29 RX ADMIN — SUCRALFATE 1 G: 1 TABLET ORAL at 11:32

## 2020-09-29 RX ADMIN — MONTELUKAST SODIUM 10 MG: 10 TABLET, FILM COATED ORAL at 21:22

## 2020-09-29 RX ADMIN — OCTREOTIDE ACETATE 25 MCG/HR: 500 INJECTION, SOLUTION INTRAVENOUS; SUBCUTANEOUS at 03:10

## 2020-09-29 RX ADMIN — FAMOTIDINE 20 MG: 20 TABLET ORAL at 21:22

## 2020-09-29 RX ADMIN — SODIUM CHLORIDE, PRESERVATIVE FREE 10 ML: 5 INJECTION INTRAVENOUS at 08:40

## 2020-09-29 RX ADMIN — FAMOTIDINE 20 MG: 20 TABLET ORAL at 13:22

## 2020-09-29 RX ADMIN — SODIUM CHLORIDE, PRESERVATIVE FREE 10 ML: 5 INJECTION INTRAVENOUS at 21:22

## 2020-09-29 RX ADMIN — SODIUM CHLORIDE 8 MG/HR: 9 INJECTION, SOLUTION INTRAVENOUS at 03:10

## 2020-09-29 RX ADMIN — Medication 10 MG: at 08:40

## 2020-09-29 RX ADMIN — SODIUM CHLORIDE, PRESERVATIVE FREE 10 ML: 5 INJECTION INTRAVENOUS at 00:18

## 2020-09-29 RX ADMIN — AMLODIPINE BESYLATE 5 MG: 5 TABLET ORAL at 08:40

## 2020-09-29 RX ADMIN — POTASSIUM CHLORIDE 10 MEQ: 10 INJECTION, SOLUTION INTRAVENOUS at 00:18

## 2020-09-29 RX ADMIN — AZITHROMYCIN MONOHYDRATE 500 MG: 500 INJECTION, POWDER, LYOPHILIZED, FOR SOLUTION INTRAVENOUS at 15:41

## 2020-09-29 RX ADMIN — SUCRALFATE 1 G: 1 TABLET ORAL at 04:42

## 2020-09-29 RX ADMIN — ZINC SULFATE 220 MG (50 MG) CAPSULE 50 MG: CAPSULE at 08:40

## 2020-09-29 NOTE — ANESTHESIA POSTPROCEDURE EVALUATION
Department of Anesthesiology  Postprocedure Note    Patient: Karol Talbert  MRN: [id number]  YOB: 1929  Date of evaluation: 9/29/2020  Time:  6:57 AM     Procedure Summary     Date:  09/27/20 Room / Location:  [address]    Anesthesia Start:  1155 Anesthesia Stop:  4599    Procedure:  egd  (N/A ) Diagnosis:  (melana)    Surgeon:  Viviana Frank MD Responsible Provider:  John Hoang MD    Anesthesia Type:  general ASA Status:  3 - Emergent          Anesthesia Type: general    Frantz Phase I:      Frantz Phase II:      Last vitals: Reviewed and per EMR flowsheets.        Anesthesia Post Evaluation    Patient location during evaluation: ICU  Patient participation: complete - patient participated  Level of consciousness: awake and alert  Pain score: 0  Airway patency: patent  Nausea & Vomiting: no nausea and no vomiting  Complications: no  Cardiovascular status: blood pressure returned to baseline  Respiratory status: acceptable  Hydration status: stable

## 2020-09-29 NOTE — PROGRESS NOTES
Pt has had around 6 to 7 bowel movements so far throughout the shift. Once the linens and the pt had been changed and cleaned thoroughly, I noticed that her inner thighs and sacral region were becoming excoriated due to the increased amount of bowel movements. Barrier cream was applied thoroughly to both areas and the provider was notified. Will continue to monitor closely.

## 2020-09-29 NOTE — PROGRESS NOTES
Hospitalist Progress Note    Patient:  Rohan Petersen  YOB: 1929  Date of Service: 9/29/2020  MRN: [id number]   Acct: [de-identified]   Primary Care Physician: Alex Maddox MD  Advance Directive: [facility]  Admit Date: 9/26/2020       Hospital Day: 3  Referring Provider: Yuval Sparks DO    Patient Seen, Chart, Consults, Notes, Labs, Radiology studies reviewed. Subjective:  Rohan Petersen is a 80 y.o. female  whom we are following for GI bleed, large posterior wall duodenal ulcer, COVID-19 infection. H&H has remained stable. She had a syncopal episode last night while she was sitting on the commode. This resolved on its own. She still is not displaying any pulmonary symptoms from the standpoint of COVID. She remains on room air without any respiratory symptoms. Allergies:  Patient has no known allergies.     Medicines:  Current Facility-Administered Medications   Medication Dose Route Frequency Provider Last Rate Last Dose    amLODIPine (NORVASC) tablet 5 mg  5 mg Oral Daily Carmie Heater, DO   5 mg at 09/29/20 0840    melatonin tablet 10 mg  10 mg Oral Daily Carmie Heater, DO   10 mg at 09/29/20 0840    montelukast (SINGULAIR) tablet 10 mg  10 mg Oral Nightly Carmie Heater, DO   10 mg at 09/28/20 2028    vitamin D (CHOLECALCIFEROL) capsule 5,000 Units  5,000 Units Oral Daily Carmie Heater, DO   5,000 Units at 09/29/20 0840    vitamin D (ERGOCALCIFEROL) capsule 50,000 Units  50,000 Units Oral Weekly Carmie Heater, DO   50,000 Units at 09/27/20 1053    zinc sulfate (ZINCATE) capsule 50 mg  50 mg Oral Daily Carmie Heater, DO   50 mg at 09/29/20 0840    azithromycin (ZITHROMAX) 500 mg in D5W 250ml Vial Mate  500 mg Intravenous Q24H Carmie Heater, DO   Stopped at 09/28/20 1712    sucralfate (CARAFATE) tablet 1 g  1 g Oral 4 times per day Rodríguez Dominguez MD   1 g at 09/29/20 1132    sodium chloride flush 0.9 % injection 10 mL  10 mL Intravenous 2 times per day Nancye Emmanuel, DO   10 mL at 09/29/20 0840    sodium chloride flush 0.9 % injection 10 mL  10 mL Intravenous PRN Nancye Emmanuel, DO        acetaminophen (TYLENOL) tablet 650 mg  650 mg Oral Q6H PRN Nancye Emmanuel, DO        Or    acetaminophen (TYLENOL) suppository 650 mg  650 mg Rectal Q6H PRN Nancye Emmanuel, DO        ondansetron (ZOFRAN-ODT) disintegrating tablet 4 mg  4 mg Oral Q8H PRN Nancye Emmanuel, DO        Or    ondansetron [facility]) injection 4 mg  4 mg Intravenous Q6H PRN Nancye Emmanuel, DO        pantoprazole (PROTONIX) 80 mg in sodium chloride 0.9 % 100 mL infusion  8 mg/hr Intravenous Continuous Nancye Emmanuel, DO 10 mL/hr at 09/29/20 0310 8 mg/hr at 09/29/20 0310    pantoprazole (PROTONIX) injection 40 mg  40 mg Intravenous Daily Nancye Emmanuel, DO        And    sodium chloride (PF) 0.9 % injection 10 mL  10 mL Intravenous Daily Nancye Emmanuel, DO        octreotide (SANDOSTATIN) 500 mcg in sodium chloride 0.9 % 100 mL infusion  25 mcg/hr Intravenous Continuous Nancye Emmanuel, DO 5 mL/hr at 09/29/20 0310 25 mcg/hr at 09/29/20 0310       Past Medical History:  Past Medical History:   Diagnosis Date    Acid reflux     Hypertension     Macular degeneration     Dry Type    OA (osteoarthritis)     knees    Palliative care patient 09/28/2020       Past Surgical History:  Past Surgical History:   Procedure Laterality Date    ANKLE SURGERY Right     CARPAL TUNNEL RELEASE Left     CHOLECYSTECTOMY      TOTAL HIP ARTHROPLASTY Bilateral     TUBAL LIGATION      UPPER GASTROINTESTINAL ENDOSCOPY  09/28/2020    Dr Shyann Singh poster wall duodenal ulcer in the setting of Motrin       Family History  Family History   Problem Relation Age of Onset    Diabetes Mother     Cancer Father         prostate metastatic    Cancer Sister         breast    Glaucoma Son     Elevated Lipids Son     Arthritis Daughter    [facility] Elevated Lipids Daughter        Social History  Social History     Socioeconomic History    Marital status:       Spouse name: Not on file    Number of children: 5    Years of education: Not on file    Highest education level: Not on file   Occupational History    Occupation: house wife   Social Needs    Financial resource strain: Not on file    Food insecurity     Worry: Not on file     Inability: Not on file   [employer] needs     Medical: Not on file     Non-medical: Not on file   Tobacco Use    Smoking status: Former Smoker    Smokeless tobacco: Never Used    Tobacco comment: \"smoked a few ciggaerttes once and a while\" as per daughter, Dorothy Garg won't tell that\" as per her son   Substance and Sexual Activity    Alcohol use: Yes     Comment: rarely has a sip of wine    Drug use: Never    Sexual activity: Not on file     Comment: had 9 kids over ten and a half years   Lifestyle    Physical activity     Days per week: Not on file     Minutes per session: Not on file    Stress: Not on file   Relationships    Social connections     Talks on phone: Not on file     Gets together: Not on file     Attends Rastafari service: Not on file     Active member of club or organization: Not on file     Attends meetings of clubs or organizations: Not on file     Relationship status: Not on file    Intimate partner violence     Fear of current or ex partner: Not on file     Emotionally abused: Not on file     Physically abused: Not on file     Forced sexual activity: Not on file   Other Topics Concern    Not on file   Social History Narrative    CODE STATUS: DNR-CCA    HEALTH CARE PROXY: her son is her [facility] [employer]. [place], Mr Silvia Felix, [phone] (cellular) , [phone] (home)    AMBULATES: with walker    DOMICILED: lives alone has a care giver there during the days         Review of Systems:    Review of Systems   Unable to perform ROS: Other           Objective:  Blood pressure (!) 94/51, pulse 81, temperature 98 °F (36.7 °C), temperature source Axillary, resp. rate 16, height 5' 3\" (1.6 m), weight 117 lb 8 oz (53.3 kg), SpO2 95 %. Intake/Output Summary (Last 24 hours) at 9/29/2020 1255  Last data filed at 9/29/2020 0400  Gross per 24 hour   Intake 1147 ml   Output 700 ml   Net 447 ml       Physical Exam  Not done in an effort to preserve PPE. Labs:  BMP:   Recent Labs     09/26/20 2314 09/27/20 0455 09/28/20 1420 09/29/20  0330   * 132* 130* 129*   K 4.0 3.6 2.9* 4.5   CL 91* 94* 93* 91*   CO2 24 25 25 23   PHOS 2.4*  --   --   --    BUN 35* 30* 10 9   CREATININE 0.6 0.7 0.6 0.6   CALCIUM 8.7 8.7 8.7 9.0     CBC:   Recent Labs     09/26/20 2314 09/27/20 0455 09/28/20 0445 09/28/20 1420 09/28/20 2010 09/29/20  0519   WBC 12.6* 11.0*  --  9.5  --   --   --    HGB 9.6* 9.7*  9.8*   < > 9.3* 9.2* 9.0* 12.1   HCT 27.6* 28.7*  28.8*   < > 28.3* 27.3* 26.4* 35.3*   MCV 89.6 91.4  --  93.1  --   --   --     284  --  270  --   --   --     < > = values in this interval not displayed. LIVER PROFILE:   Recent Labs     09/26/20 2314   AST 21   ALT 9   BILITOT 0.4   ALKPHOS 51     PT/INR:   Recent Labs     09/26/20 2314   PROTIME 14.8*   INR 1.16     APTT:   Recent Labs     09/26/20 2314   APTT 33.0     BNP:  No results for input(s): BNP in the last 72 hours. Ionized Calcium:No results for input(s): IONCA in the last 72 hours. Magnesium:  Recent Labs     09/26/20  2314 09/28/20  1420   MG 1.8 1.5*     Phosphorus:  Recent Labs     09/26/20  2314   PHOS 2.4*     HgbA1C: No results for input(s): LABA1C in the last 72 hours. Hepatic:   Recent Labs     09/26/20 2314   ALKPHOS 51   ALT 9   AST 21   PROT 6.7   BILITOT 0.4   LABALBU 3.4*     Lactic Acid:   Recent Labs     09/26/20 2314   LACTA 1.2     Troponin: No results for input(s): CKTOTAL, CKMB, TROPONINT in the last 72 hours. ABGs: No results for input(s): PH, PCO2, PO2, HCO3, O2SAT in the last 72 hours.   CRP:  No results for input(s): CRP in the last 72 hours. Sed Rate:  No results for input(s): SEDRATE in the last 72 hours. Cultures:   No results for input(s): CULTURE in the last 72 hours. No results for input(s): BCKady in the last 72 hours. No results for input(s): CXSURG in the last 72 hours. Radiology reports as per the Radiologist  Radiology: No results found. Assessment     Upper gastrointestinal bleed secondary to a large posterior duodenal ulcer. Continue PPI.     COVID-19 infection. No pulmonary symptoms at present. Hold off on intervention with dexamethasone and remdesivir at this time. Continue adjuvant therapy.     Moderate malnutrition.   Nutritional support      Lilliana Hamilton,

## 2020-09-29 NOTE — PLAN OF CARE
Problem: Falls - Risk of:  Goal: Will remain free from falls  Description: Will remain free from falls  9/29/2020 0624 by Odalys Khalil RN  Outcome: Ongoing  9/28/2020 1714 by Jaclyn Casanova RN  Outcome: Ongoing  Goal: Absence of physical injury  Description: Absence of physical injury  9/29/2020 0624 by Odalys Khalil RN  Outcome: Ongoing  9/28/2020 1714 by Jaclyn Casanova RN  Outcome: Ongoing     Problem: Skin Integrity:  Goal: Will show no infection signs and symptoms  Description: Will show no infection signs and symptoms  9/29/2020 0624 by Odalys Khalil RN  Outcome: Ongoing  9/28/2020 1714 by Jaclyn Casanova RN  Outcome: Ongoing  Goal: Absence of new skin breakdown  Description: Absence of new skin breakdown  9/29/2020 0624 by Odalys Khalil RN  Outcome: Ongoing  9/28/2020 1714 by Jaclyn Casanova RN  Outcome: Ongoing     Problem: Airway Clearance - Ineffective  Goal: Achieve or maintain patent airway  9/29/2020 0624 by Odalys Khalil RN  Outcome: Ongoing  9/28/2020 1714 by Jaclyn Casanova RN  Outcome: Ongoing     Problem: Gas Exchange - Impaired  Goal: Absence of hypoxia  9/29/2020 0624 by Odalys Khalil RN  Outcome: Ongoing  9/28/2020 1714 by Jaclyn Casanova RN  Outcome: Ongoing  Goal: Promote optimal lung function  9/29/2020 0624 by Odalys Khalil RN  Outcome: Ongoing  9/28/2020 1714 by Jaclyn Casanova RN  Outcome: Ongoing     Problem: Breathing Pattern - Ineffective  Goal: Ability to achieve and maintain a regular respiratory rate  9/29/2020 0624 by Odalys Khalil RN  Outcome: Ongoing  9/28/2020 1714 by Jaclyn Casanova RN  Outcome: Ongoing     Problem:  Body Temperature -  Risk of, Imbalanced  Goal: Ability to maintain a body temperature within defined limits  9/29/2020 0624 by Odalys Khalil RN  Outcome: Ongoing  9/28/2020 1714 by Jaclyn Casanova RN  Outcome: Ongoing  Goal: Will regain or maintain usual level of consciousness  9/29/2020 0624 by Odalys Khalil RN  Outcome: Ongoing  9/28/2020 1714 by Leonarda Duncan RN  Outcome: Ongoing  Goal: Complications related to the disease process, condition or treatment will be avoided or minimized  9/29/2020 0624 by Mary Alonso RN  Outcome: Ongoing  9/28/2020 1714 by Leonarda Duncan RN  Outcome: Ongoing     Problem: Isolation Precautions - Risk of Spread of Infection  Goal: Prevent transmission of infection  9/29/2020 0624 by Mary Alonso RN  Outcome: Ongoing  9/28/2020 1714 by Leonarda Duncan RN  Outcome: Ongoing     Problem: Nutrition Deficits  Goal: Optimize nutrtional status  9/29/2020 0624 by Mary Alonso RN  Outcome: Ongoing  9/28/2020 1714 by Leonarda Duncan RN  Outcome: Ongoing     Problem: Risk for Fluid Volume Deficit  Goal: Maintain normal heart rhythm  9/29/2020 0624 by Mary Alonso RN  Outcome: Ongoing  9/28/2020 1714 by Leonarda Duncan RN  Outcome: Ongoing  Goal: Maintain absence of muscle cramping  9/29/2020 0624 by Mary Alonso RN  Outcome: Ongoing  9/28/2020 1714 by Leonarda Duncan RN  Outcome: Ongoing  Goal: Maintain normal serum potassium, sodium, calcium, phosphorus, and pH  9/29/2020 0624 by Mary Alonso RN  Outcome: Ongoing  9/28/2020 1714 by Leonarda Duncan RN  Outcome: Ongoing     Problem: Loneliness or Risk for Loneliness  Goal: Demonstrate positive use of time alone when socialization is not possible  9/29/2020 0624 by Mary Alonso RN  Outcome: Ongoing  9/28/2020 1714 by Leonarda Duncan RN  Outcome: Ongoing     Problem: Fatigue  Goal: Verbalize increase energy and improved vitality  9/29/2020 0624 by Mary Alonso RN  Outcome: Ongoing  9/28/2020 1714 by Leonarda Duncan RN  Outcome: Ongoing     Problem: Patient Education: Go to Patient Education Activity  Goal: Patient/Family Education  9/29/2020 8702 by Mary Alonso RN  Outcome: Ongoing  9/28/2020 1714 by Leonarda Duncan RN  Outcome: Ongoing     Problem: Nutrition  Goal: Optimal nutrition therapy  9/29/2020 0624 by Mary Alonso RN  Outcome:

## 2020-09-30 LAB
ANION GAP SERPL CALCULATED.3IONS-SCNC: 12 MMOL/L (ref 7–19)
BUN BLDV-MCNC: 7 MG/DL (ref 8–23)
CALCIUM SERPL-MCNC: 8.9 MG/DL (ref 8.2–9.6)
CHLORIDE BLD-SCNC: 94 MMOL/L (ref 98–111)
CO2: 24 MMOL/L (ref 22–29)
CREAT SERPL-MCNC: 0.6 MG/DL (ref 0.5–0.9)
GFR AFRICAN AMERICAN: >59
GFR NON-AFRICAN AMERICAN: >60
GLUCOSE BLD-MCNC: 100 MG/DL (ref 74–109)
HCT VFR BLD CALC: 28.8 % (ref 37–47)
HEMOGLOBIN: 9.8 G/DL (ref 12–16)
MCH RBC QN AUTO: 30.5 PG (ref 27–31)
MCHC RBC AUTO-ENTMCNC: 34 G/DL (ref 33–37)
MCV RBC AUTO: 89.7 FL (ref 81–99)
PDW BLD-RTO: 12.6 % (ref 11.5–14.5)
PLATELET # BLD: 318 K/UL (ref 130–400)
PMV BLD AUTO: 9.8 FL (ref 9.4–12.3)
POTASSIUM REFLEX MAGNESIUM: 3.9 MMOL/L (ref 3.5–5)
RBC # BLD: 3.21 M/UL (ref 4.2–5.4)
REASON FOR REJECTION: NORMAL
REJECTED TEST: NORMAL
SODIUM BLD-SCNC: 130 MMOL/L (ref 136–145)
WBC # BLD: 13.7 K/UL (ref 4.8–10.8)

## 2020-09-30 PROCEDURE — 2580000003 HC RX 258: Performed by: INTERNAL MEDICINE

## 2020-09-30 PROCEDURE — 6360000002 HC RX W HCPCS: Performed by: INTERNAL MEDICINE

## 2020-09-30 PROCEDURE — 85027 COMPLETE CBC AUTOMATED: CPT

## 2020-09-30 PROCEDURE — 6370000000 HC RX 637 (ALT 250 FOR IP): Performed by: INTERNAL MEDICINE

## 2020-09-30 PROCEDURE — 80048 BASIC METABOLIC PNL TOTAL CA: CPT

## 2020-09-30 PROCEDURE — 2100000000 HC CCU R&B

## 2020-09-30 RX ORDER — AZITHROMYCIN 250 MG/1
250 TABLET, FILM COATED ORAL DAILY
Status: COMPLETED | OUTPATIENT
Start: 2020-10-01 | End: 2020-10-03

## 2020-09-30 RX ORDER — FAMOTIDINE 20 MG/1
20 TABLET, FILM COATED ORAL DAILY
Status: DISCONTINUED | OUTPATIENT
Start: 2020-10-01 | End: 2020-10-04

## 2020-09-30 RX ADMIN — MONTELUKAST SODIUM 10 MG: 10 TABLET, FILM COATED ORAL at 19:51

## 2020-09-30 RX ADMIN — AZITHROMYCIN MONOHYDRATE 500 MG: 500 INJECTION, POWDER, LYOPHILIZED, FOR SOLUTION INTRAVENOUS at 16:28

## 2020-09-30 RX ADMIN — SUCRALFATE 1 G: 1 TABLET ORAL at 00:31

## 2020-09-30 RX ADMIN — PANTOPRAZOLE SODIUM 40 MG: 40 TABLET, DELAYED RELEASE ORAL at 16:28

## 2020-09-30 RX ADMIN — SUCRALFATE 1 G: 1 TABLET ORAL at 23:58

## 2020-09-30 RX ADMIN — SUCRALFATE 1 G: 1 TABLET ORAL at 12:45

## 2020-09-30 RX ADMIN — SODIUM CHLORIDE, PRESERVATIVE FREE 10 ML: 5 INJECTION INTRAVENOUS at 09:05

## 2020-09-30 RX ADMIN — Medication 5000 UNITS: at 09:05

## 2020-09-30 RX ADMIN — AMLODIPINE BESYLATE 5 MG: 5 TABLET ORAL at 09:05

## 2020-09-30 RX ADMIN — Medication 10 MG: at 09:05

## 2020-09-30 RX ADMIN — SODIUM CHLORIDE, PRESERVATIVE FREE 10 ML: 5 INJECTION INTRAVENOUS at 19:51

## 2020-09-30 RX ADMIN — FAMOTIDINE 20 MG: 20 TABLET ORAL at 09:05

## 2020-09-30 RX ADMIN — SUCRALFATE 1 G: 1 TABLET ORAL at 05:14

## 2020-09-30 RX ADMIN — PANTOPRAZOLE SODIUM 40 MG: 40 TABLET, DELAYED RELEASE ORAL at 05:14

## 2020-09-30 RX ADMIN — ZINC SULFATE 220 MG (50 MG) CAPSULE 50 MG: CAPSULE at 09:05

## 2020-09-30 NOTE — PLAN OF CARE
Problem: Falls - Risk of:  Goal: Will remain free from falls  Description: Will remain free from falls  Outcome: Ongoing  Goal: Absence of physical injury  Description: Absence of physical injury  Outcome: Ongoing     Problem: Skin Integrity:  Goal: Will show no infection signs and symptoms  Description: Will show no infection signs and symptoms  Outcome: Ongoing  Goal: Absence of new skin breakdown  Description: Absence of new skin breakdown  Outcome: Ongoing     Problem: Airway Clearance - Ineffective  Goal: Achieve or maintain patent airway  Outcome: Ongoing     Problem: Gas Exchange - Impaired  Goal: Absence of hypoxia  Outcome: Ongoing  Goal: Promote optimal lung function  Outcome: Ongoing     Problem: Breathing Pattern - Ineffective  Goal: Ability to achieve and maintain a regular respiratory rate  Outcome: Ongoing     Problem:  Body Temperature -  Risk of, Imbalanced  Goal: Ability to maintain a body temperature within defined limits  Outcome: Ongoing  Goal: Will regain or maintain usual level of consciousness  Outcome: Ongoing  Goal: Complications related to the disease process, condition or treatment will be avoided or minimized  Outcome: Ongoing     Problem: Isolation Precautions - Risk of Spread of Infection  Goal: Prevent transmission of infection  Outcome: Ongoing     Problem: Nutrition Deficits  Goal: Optimize nutrtional status  Outcome: Ongoing     Problem: Risk for Fluid Volume Deficit  Goal: Maintain normal heart rhythm  Outcome: Ongoing  Goal: Maintain absence of muscle cramping  Outcome: Ongoing  Goal: Maintain normal serum potassium, sodium, calcium, phosphorus, and pH  Outcome: Ongoing     Problem: Loneliness or Risk for Loneliness  Goal: Demonstrate positive use of time alone when socialization is not possible  Outcome: Ongoing     Problem: Fatigue  Goal: Verbalize increase energy and improved vitality  Outcome: Ongoing     Problem: Patient Education: Go to Patient Education Activity  Goal: Patient/Family Education  Outcome: Ongoing     Problem: Nutrition  Goal: Optimal nutrition therapy  Outcome: Ongoing     Problem: Restraint Use - Nonviolent/Non-Self-Destructive Behavior:  Goal: Absence of restraint indications  Description: Absence of restraint indications  Outcome: Ongoing  Goal: Absence of restraint-related injury  Description: Absence of restraint-related injury  Outcome: Ongoing

## 2020-09-30 NOTE — PROGRESS NOTES
Comprehensive Nutrition Assessment    Type and Reason for Visit:  Reassess    Nutrition Recommendations/Plan: start ONS    Nutrition Assessment:  Pt continues to meet criteria for moderate malnutrition. Pt remains at further nutritional risk d/t wounds, confusion and liquid diet. Will start ONS    Malnutrition Assessment:  Malnutrition Status: Moderate malnutrition    Context:  Chronic Illness     Findings of the 6 clinical characteristics of malnutrition:  Energy Intake:  7 - 75% or less estimated energy requirements for 1 month or longer  Weight Loss:  7 - Greater than 20% over 1 year     Body Fat Loss:  No significant body fat loss     Muscle Mass Loss:  No significant muscle mass loss    Fluid Accumulation:  No significant fluid accumulation Extremities   Strength:  Not Performed    Estimated Daily Nutrient Needs:  Energy (kcal):  7391-2668; Weight Used for Energy Requirements:  Current     Protein (g):  69-83; Weight Used for Protein Requirements:  Current(1.25-1. 5)        Fluid (ml/day):  2989-8644; Weight Used for Fluid Requirements:  Current      Nutrition Related Findings:         Wounds:  Stage I       Current Nutrition Therapies:    DIET CLEAR LIQUID;     Anthropometric Measures:  · Height: 5' 3\" (160 cm)  · Current Body Weight: 118 lb (53.5 kg)   · Admission Body Weight:      · Usual Body Weight:       · Ideal Body Weight: 115 lbs; % Ideal Body Weight 102.6 %   · BMI: 20.9  · Adjusted Body Weight:  ; No Adjustment   · BMI Categories: Underweight (BMI less than 22) age over 72       Nutrition Diagnosis:   · Inadequate protein-energy intake related to early satiety, acute injury/trauma as evidenced by poor intake prior to admission, weight loss      Nutrition Interventions:   Food and/or Nutrient Delivery:  Continue Current Diet, Start Oral Nutrition Supplement  Nutrition Education/Counseling:  No recommendation at this time   Coordination of Nutrition Care:  Continued Inpatient

## 2020-09-30 NOTE — PROGRESS NOTES
Hospitalist Progress Note  [facility]     Patient: Chris Jordan  : 1929  MRN: [id number]  Code Status: [facility]    Hospital Day: 4   Date of Service: 2020    Subjective:   Pt seen in COVID-19 unit. No acute distress. Past Medical History:   Diagnosis Date    Acid reflux     Hypertension     Macular degeneration     Dry Type    OA (osteoarthritis)     knees    Palliative care patient 2020       Past Surgical History:   Procedure Laterality Date    ANKLE SURGERY Right     CARPAL TUNNEL RELEASE Left     CHOLECYSTECTOMY      TOTAL HIP ARTHROPLASTY Bilateral     TUBAL LIGATION      UPPER GASTROINTESTINAL ENDOSCOPY  2020    Dr Chris Roberts poster wall duodenal ulcer in the setting of Motrin       Family History   Problem Relation Age of Onset    Diabetes Mother     Cancer Father         prostate metastatic    Cancer Sister         breast    Glaucoma Son     Elevated Lipids Son     Arthritis Daughter     Elevated Lipids Daughter        Social History     Socioeconomic History    Marital status:       Spouse name: Not on file    Number of children: 5    Years of education: Not on file    Highest education level: Not on file   Occupational History    Occupation: house wife   Social Needs    Financial resource strain: Not on file    Food insecurity     Worry: Not on file     Inability: Not on file   [employer] needs     Medical: Not on file     Non-medical: Not on file   Tobacco Use    Smoking status: Former Smoker    Smokeless tobacco: Never Used    Tobacco comment: \"smoked a few ciggaerttes once and a while\" as per daughter, Cuong Pickens won't tell that\" as per her son   Substance and Sexual Activity    Alcohol use: Yes     Comment: rarely has a sip of wine    Drug use: Never    Sexual activity: Not on file     Comment: had 9 kids over ten and a half years   Lifestyle    Physical activity     Days per week: Not on file     Minutes per session: Not on file    Stress: Not on file   Relationships    Social connections     Talks on phone: Not on file     Gets together: Not on file     Attends Voodoo service: Not on file     Active member of club or organization: Not on file     Attends meetings of clubs or organizations: Not on file     Relationship status: Not on file    Intimate partner violence     Fear of current or ex partner: Not on file     Emotionally abused: Not on file     Physically abused: Not on file     Forced sexual activity: Not on file   Other Topics Concern    Not on file   Social History Narrative    CODE STATUS: DNR-CCA    HEALTH CARE PROXY: her son is her [address], Mr Alondra Villagomez, [phone] (cellular) , [phone] (home)    AMBULATES: with walker    DOMICILED: lives alone has a care giver there during the days       Current Facility-Administered Medications   Medication Dose Route Frequency Provider Last Rate Last Dose    [START ON 10/1/2020] famotidine (PEPCID) tablet 20 mg  20 mg Oral Daily Kitty Heman, DO        pantoprazole (PROTONIX) tablet 40 mg  40 mg Oral BID AC Kitty Heman, DO   40 mg at 09/30/20 1628    loperamide (IMODIUM) capsule 4 mg  4 mg Oral 4x Daily PRN Kitty Heman, DO        amLODIPine (NORVASC) tablet 5 mg  5 mg Oral Daily Kitty Heman, DO   5 mg at 09/30/20 3887    melatonin tablet 10 mg  10 mg Oral Daily Kitty Heman, DO   10 mg at 09/30/20 7900    montelukast (SINGULAIR) tablet 10 mg  10 mg Oral Nightly Kitty Heman, DO   10 mg at 09/29/20 2122    vitamin D (CHOLECALCIFEROL) capsule 5,000 Units  5,000 Units Oral Daily Kitty Heman, DO   5,000 Units at 09/30/20 7403    vitamin D (ERGOCALCIFEROL) capsule 50,000 Units  50,000 Units Oral Weekly Kitty Heman, DO   50,000 Units at 09/27/20 1053    zinc sulfate (ZINCATE) capsule 50 mg  50 mg Oral Daily Kitty Heman, DO   50 mg at 09/30/20 2605    azithromycin (ZITHROMAX) 500 mg in D5W 250ml Vial Mate  500 mg Intravenous Q24H Michaelle Barrett,  mL/hr at 09/30/20 1628 500 mg at 09/30/20 1628    sucralfate (CARAFATE) tablet 1 g  1 g Oral 4 times per day Patito Valero MD   1 g at 09/30/20 1245    sodium chloride flush 0.9 % injection 10 mL  10 mL Intravenous 2 times per day Michaelle Barrett, DO   10 mL at 09/30/20 5247    sodium chloride flush 0.9 % injection 10 mL  10 mL Intravenous PRN Michaelle Barrett, DO        acetaminophen (TYLENOL) tablet 650 mg  650 mg Oral Q6H PRN Michaelle Barrett, DO        Or    acetaminophen (TYLENOL) suppository 650 mg  650 mg Rectal Q6H PRN Michaelle Barrett, DO        ondansetron (ZOFRAN-ODT) disintegrating tablet 4 mg  4 mg Oral Q8H PRN Michaelle Barrett, DO        Or    ondansetron [facility]) injection 4 mg  4 mg Intravenous Q6H PRN Michaelle Barrett, DO              Objective:   /60   Pulse 65   Temp 96.9 °F (36.1 °C) (Axillary)   Resp 20   Ht 5' 3\" (1.6 m)   Wt 118 lb (53.5 kg)   SpO2 96%   BMI 20.90 kg/m²     Exam not performed in an effort to preserve PPE however case discussed in great detail with unit staff    Recent Labs     09/28/20  0445  09/28/20 2010 09/29/20  0519 09/30/20  0315   WBC 9.5  --   --   --  13.7*   RBC 3.04*  --   --   --  3.21*   HGB 9.3*   < > 9.0* 12.1 9.8*   HCT 28.3*   < > 26.4* 35.3* 28.8*   MCV 93.1  --   --   --  89.7   MCH 30.6  --   --   --  30.5   MCHC 32.9*  --   --   --  34.0     --   --   --  318    < > = values in this interval not displayed. Recent Labs     09/28/20  1420 09/29/20  0330 09/30/20  0220   * 129* 130*   K 2.9* 4.5 3.9   ANIONGAP 12 15 12   CL 93* 91* 94*   CO2 25 23 24   BUN 10 9 7*   CREATININE 0.6 0.6 0.6   GLUCOSE 123* 119* 100   CALCIUM 8.7 9.0 8.9     Recent Labs     09/28/20  1420   MG 1.5*     No results for input(s): AST, ALT, ALB, BILITOT, ALKPHOS, ALB in the last 72 hours.   No results for input(s): PH, PO2, PCO2, HCO3, BE, O2SAT in the last 72 hours. No results for input(s): TROPONINI in the last 72 hours. No results for input(s): INR in the last 72 hours. No results for input(s): LACTA in the last 72 hours. Intake/Output Summary (Last 24 hours) at 9/30/2020 1646  Last data filed at 9/30/2020 1100  Gross per 24 hour   Intake --   Output 450 ml   Net -450 ml       No results found.      Assessment and Plan:   GI bleed  GI following  EGD demonstrated large duodenal ulcer  Patient counseled on Motrin usage  Protonix/sucralfate  Follow hemoglobin    COVID-19 infection  Diagnosed via screening test prior to EGD  Remains asymptomatic  Supportive care  Infection control following    Moderate malnutrition  Dietitian recs followed    DVT prophylaxis  SCDs    Social work following for discharge [id number] [facility] Enrique Mahoney MD   9/30/2020 4:46 PM

## 2020-09-30 NOTE — CARE COORDINATION
Spoke with pt's son, Oscar Trivedi, about dc recommendations of SNF placement at [place]. Son is in agreement but reports his sister/pt's dtr is adamant will not allow pt to be placed in a SNF. Pt's dtr states will quit her job for 24/7 care in the pt's home. SW mentioned availability of [facility] services for Sn and therapy if dtr does blossom to be 24/7 CG for pt. Son states would consider [facility] SNF in [place] [address] if decides for placement. SW explained would have to ensure facility able to accept the pt at this time d/t covid + results 9/27. Son requested to speak further with his siblings to determine dc services and will contact [facility] with decision.

## 2020-09-30 NOTE — PROGRESS NOTES
Follow up:  Report from pt's nurse. She stable but in isolation for COVID-19. No more BM's today. Noted from SW, working on discharge plans. Family making decisions for home vs NH. Following for support. Available if needed.     Electronically signed by Talbert Brittle, RN on 9/30/2020 at 3:37 PM

## 2020-09-30 NOTE — PROGRESS NOTES
Pt did not have any bowel movements throughout shift, only urinated. Patient remains on restraints due to trying to leave the bed even with explanations of why it is not safe for her. No significant changes during shift.        Electronically signed by Serina Lehman RN on 9/30/2020 at 6:17 AM

## 2020-09-30 NOTE — PROGRESS NOTES
Pharmacy Renal Adjustment    Missy Stahl is a 80 y.o. female. Pharmacy has renally adjusted medications per protocol. Recent Labs     09/29/20  0330 09/30/20  0220   BUN 9 7*       Recent Labs     09/29/20  0330 09/30/20  0220   CREATININE 0.6 0.6       Estimated Creatinine Clearance: 51 mL/min (based on SCr of 0.6 mg/dL). Height:   Ht Readings from Last 1 Encounters:   09/28/20 5' 3\" (1.6 m)     Weight:  Wt Readings from Last 1 Encounters:   09/30/20 118 lb (53.5 kg)     Plan: Adjust the following medications based on renal function:           Pepcid adjusted to 20 mg by mouth daily r/t CrCl < 60 mL/min.     Electronically signed by Luis Enrique Toussaint, PharmD, BCPS on 9/30/2020 at 2:15 PM

## 2020-10-01 LAB
ANION GAP SERPL CALCULATED.3IONS-SCNC: 15 MMOL/L (ref 7–19)
BASOPHILS ABSOLUTE: 0.1 K/UL (ref 0–0.2)
BASOPHILS RELATIVE PERCENT: 0.7 % (ref 0–1)
BUN BLDV-MCNC: 6 MG/DL (ref 8–23)
CALCIUM SERPL-MCNC: 8.9 MG/DL (ref 8.2–9.6)
CHLORIDE BLD-SCNC: 90 MMOL/L (ref 98–111)
CO2: 23 MMOL/L (ref 22–29)
CREAT SERPL-MCNC: 0.5 MG/DL (ref 0.5–0.9)
EOSINOPHILS ABSOLUTE: 0.2 K/UL (ref 0–0.6)
EOSINOPHILS RELATIVE PERCENT: 1.5 % (ref 0–5)
GFR AFRICAN AMERICAN: >59
GFR NON-AFRICAN AMERICAN: >60
GLUCOSE BLD-MCNC: 79 MG/DL (ref 74–109)
HCT VFR BLD CALC: 27.5 % (ref 37–47)
HEMOGLOBIN: 9.4 G/DL (ref 12–16)
IMMATURE GRANULOCYTES #: 0.1 K/UL
LYMPHOCYTES ABSOLUTE: 3.1 K/UL (ref 1.1–4.5)
LYMPHOCYTES RELATIVE PERCENT: 22.7 % (ref 20–40)
MAGNESIUM: 1.4 MG/DL (ref 1.7–2.3)
MCH RBC QN AUTO: 30.7 PG (ref 27–31)
MCHC RBC AUTO-ENTMCNC: 34.2 G/DL (ref 33–37)
MCV RBC AUTO: 89.9 FL (ref 81–99)
MONOCYTES ABSOLUTE: 1.4 K/UL (ref 0–0.9)
MONOCYTES RELATIVE PERCENT: 9.9 % (ref 0–10)
NEUTROPHILS ABSOLUTE: 8.9 K/UL (ref 1.5–7.5)
NEUTROPHILS RELATIVE PERCENT: 64.6 % (ref 50–65)
PDW BLD-RTO: 12.6 % (ref 11.5–14.5)
PLATELET # BLD: 314 K/UL (ref 130–400)
PMV BLD AUTO: 9.9 FL (ref 9.4–12.3)
POTASSIUM SERPL-SCNC: 3.1 MMOL/L (ref 3.5–5)
RBC # BLD: 3.06 M/UL (ref 4.2–5.4)
SODIUM BLD-SCNC: 128 MMOL/L (ref 136–145)
WBC # BLD: 13.8 K/UL (ref 4.8–10.8)

## 2020-10-01 PROCEDURE — 6370000000 HC RX 637 (ALT 250 FOR IP): Performed by: INTERNAL MEDICINE

## 2020-10-01 PROCEDURE — 51798 US URINE CAPACITY MEASURE: CPT

## 2020-10-01 PROCEDURE — 6360000002 HC RX W HCPCS: Performed by: HOSPITALIST

## 2020-10-01 PROCEDURE — 6370000000 HC RX 637 (ALT 250 FOR IP): Performed by: HOSPITALIST

## 2020-10-01 PROCEDURE — 2580000003 HC RX 258: Performed by: INTERNAL MEDICINE

## 2020-10-01 PROCEDURE — 2100000000 HC CCU R&B

## 2020-10-01 PROCEDURE — 85025 COMPLETE CBC W/AUTO DIFF WBC: CPT

## 2020-10-01 PROCEDURE — 83735 ASSAY OF MAGNESIUM: CPT

## 2020-10-01 PROCEDURE — 80048 BASIC METABOLIC PNL TOTAL CA: CPT

## 2020-10-01 RX ORDER — MAGNESIUM SULFATE 1 G/100ML
1 INJECTION INTRAVENOUS ONCE
Status: DISCONTINUED | OUTPATIENT
Start: 2020-10-01 | End: 2020-10-04

## 2020-10-01 RX ORDER — MAGNESIUM SULFATE 1 G/100ML
1 INJECTION INTRAVENOUS PRN
Status: DISCONTINUED | OUTPATIENT
Start: 2020-10-01 | End: 2020-10-06 | Stop reason: HOSPADM

## 2020-10-01 RX ORDER — POTASSIUM CHLORIDE 7.45 MG/ML
10 INJECTION INTRAVENOUS PRN
Status: DISCONTINUED | OUTPATIENT
Start: 2020-10-01 | End: 2020-10-06 | Stop reason: HOSPADM

## 2020-10-01 RX ORDER — SODIUM CHLORIDE, SODIUM LACTATE, POTASSIUM CHLORIDE, CALCIUM CHLORIDE 600; 310; 30; 20 MG/100ML; MG/100ML; MG/100ML; MG/100ML
INJECTION, SOLUTION INTRAVENOUS CONTINUOUS
Status: DISCONTINUED | OUTPATIENT
Start: 2020-10-01 | End: 2020-10-03

## 2020-10-01 RX ORDER — POTASSIUM CHLORIDE 20 MEQ/1
40 TABLET, EXTENDED RELEASE ORAL PRN
Status: DISCONTINUED | OUTPATIENT
Start: 2020-10-01 | End: 2020-10-06 | Stop reason: HOSPADM

## 2020-10-01 RX ORDER — POTASSIUM CHLORIDE 20 MEQ/1
40 TABLET, EXTENDED RELEASE ORAL ONCE
Status: DISCONTINUED | OUTPATIENT
Start: 2020-10-01 | End: 2020-10-06 | Stop reason: HOSPADM

## 2020-10-01 RX ADMIN — ZINC SULFATE 220 MG (50 MG) CAPSULE 50 MG: CAPSULE at 07:45

## 2020-10-01 RX ADMIN — AZITHROMYCIN DIHYDRATE 250 MG: 250 TABLET, FILM COATED ORAL at 07:45

## 2020-10-01 RX ADMIN — POTASSIUM CHLORIDE 40 MEQ: 1500 TABLET, EXTENDED RELEASE ORAL at 05:09

## 2020-10-01 RX ADMIN — SODIUM CHLORIDE, POTASSIUM CHLORIDE, SODIUM LACTATE AND CALCIUM CHLORIDE: 600; 310; 30; 20 INJECTION, SOLUTION INTRAVENOUS at 18:17

## 2020-10-01 RX ADMIN — Medication 10 MG: at 07:46

## 2020-10-01 RX ADMIN — MAGNESIUM SULFATE HEPTAHYDRATE 1 G: 1 INJECTION, SOLUTION INTRAVENOUS at 05:09

## 2020-10-01 RX ADMIN — SUCRALFATE 1 G: 1 TABLET ORAL at 17:13

## 2020-10-01 RX ADMIN — Medication 5000 UNITS: at 07:46

## 2020-10-01 RX ADMIN — SODIUM CHLORIDE, PRESERVATIVE FREE 10 ML: 5 INJECTION INTRAVENOUS at 07:46

## 2020-10-01 RX ADMIN — SUCRALFATE 1 G: 1 TABLET ORAL at 11:04

## 2020-10-01 RX ADMIN — MAGNESIUM SULFATE HEPTAHYDRATE 1 G: 1 INJECTION, SOLUTION INTRAVENOUS at 06:10

## 2020-10-01 RX ADMIN — PANTOPRAZOLE SODIUM 40 MG: 40 TABLET, DELAYED RELEASE ORAL at 17:12

## 2020-10-01 RX ADMIN — AMLODIPINE BESYLATE 5 MG: 5 TABLET ORAL at 07:45

## 2020-10-01 RX ADMIN — FAMOTIDINE 20 MG: 20 TABLET ORAL at 07:45

## 2020-10-01 RX ADMIN — PANTOPRAZOLE SODIUM 40 MG: 40 TABLET, DELAYED RELEASE ORAL at 05:09

## 2020-10-01 RX ADMIN — SODIUM CHLORIDE, PRESERVATIVE FREE 10 ML: 5 INJECTION INTRAVENOUS at 20:21

## 2020-10-01 RX ADMIN — MONTELUKAST SODIUM 10 MG: 10 TABLET, FILM COATED ORAL at 20:21

## 2020-10-01 RX ADMIN — SUCRALFATE 1 G: 1 TABLET ORAL at 05:09

## 2020-10-01 ASSESSMENT — PAIN SCALES - GENERAL: PAINLEVEL_OUTOF10: 0

## 2020-10-01 NOTE — PROGRESS NOTES
Throughout shift patient had been using bedpan but is now refusing. Placed Purewick.      Electronically signed by Ulus Dakin, RN on 10/1/2020 at 4:15 AM

## 2020-10-01 NOTE — PROGRESS NOTES
No urine collected in the purewick suction canister, brief was found wet, pt had incontinent bowel mvmt. Stool appears yellow, soft. Bladder scan showed 174 ml residual. Pt states no feeling of distension, pain. Will continue to monitor. Notified Dr. Heena Adair at 6213 regarding pt's low urine output. Will initiate IV fluid for hydration. Waiting for the order.

## 2020-10-01 NOTE — CARE COORDINATION
Spoke with pt's son, Danielle Vieira, to determine dc planning for the pt as son is POA. Son denies having spoken with his sister, pt's dtr, to explain SNF therapy is needed or 24/7 CG in the home with services to assist. SW confirmed that was the discussion yesterday in order to determine dc arrangements. Son agreed and stated did not have the conversation with his sister. SW offered to speak with the pt's dtr to explain services available and assist. Son declines SW's assistance and states \"Will talk to his sister today to see what she agrees to. \"     SW further explained needs a dc plan in place prior to the dc; which if that decision is SNF will likely be early next week as pt is required to have 10 days post covid+ results (9/27) prior to admit to SNF. Facilities accepting covid+ at this time are [facility] and [facility] and Son reports is familiar with [facility] and that would be first choice. SW awaiting further family discussion and decision. SW also discussed since Son is POA to think about [facility] for pt re: feeding tube placement or other aggressive treatments if the pt continues to not eat.

## 2020-10-01 NOTE — PROGRESS NOTES
Pt is not eating and drinking. Nurse offered pt some ensure, apple juice, and ice water. Pt only took several sips of juice and refuses to eat more. Pt states:\" I just want to go home. \"

## 2020-10-01 NOTE — PROGRESS NOTES
Hospitalist Progress Note  [facility]     Patient: Bryanna Murphy  : 1929  MRN: [id number]  Code Status: [facility]    Hospital Day: 5   Date of Service: 10/1/2020    Subjective:   Pt seen in COVID-19 unit. Sleeping comfortably. No acute distress. Past Medical History:   Diagnosis Date    Acid reflux     Hypertension     Macular degeneration     Dry Type    OA (osteoarthritis)     knees    Palliative care patient 2020       Past Surgical History:   Procedure Laterality Date    ANKLE SURGERY Right     CARPAL TUNNEL RELEASE Left     CHOLECYSTECTOMY      TOTAL HIP ARTHROPLASTY Bilateral     TUBAL LIGATION      UPPER GASTROINTESTINAL ENDOSCOPY  2020    Dr Red Elena poster wall duodenal ulcer in the setting of Motrin       Family History   Problem Relation Age of Onset    Diabetes Mother     Cancer Father         prostate metastatic    Cancer Sister         breast    Glaucoma Son     Elevated Lipids Son     Arthritis Daughter     Elevated Lipids Daughter        Social History     Socioeconomic History    Marital status:       Spouse name: Not on file    Number of children: 5    Years of education: Not on file    Highest education level: Not on file   Occupational History    Occupation: house wife   Social Needs    Financial resource strain: Not on file    Food insecurity     Worry: Not on file     Inability: Not on file   [employer] needs     Medical: Not on file     Non-medical: Not on file   Tobacco Use    Smoking status: Former Smoker    Smokeless tobacco: Never Used    Tobacco comment: \"smoked a few ciggaerttes once and a while\" as per daughter, Huong Garsia won't tell that\" as per her son   Substance and Sexual Activity    Alcohol use: Yes     Comment: rarely has a sip of wine    Drug use: Never    Sexual activity: Not on file     Comment: had 9 kids over ten and a half years   Lifestyle    Physical activity     Days per week: Not on file     Minutes per session: Not on file    Stress: Not on file   Relationships    Social connections     Talks on phone: Not on file     Gets together: Not on file     Attends Yazidi service: Not on file     Active member of club or organization: Not on file     Attends meetings of clubs or organizations: Not on file     Relationship status: Not on file    Intimate partner violence     Fear of current or ex partner: Not on file     Emotionally abused: Not on file     Physically abused: Not on file     Forced sexual activity: Not on file   Other Topics Concern    Not on file   Social History Narrative    CODE STATUS: DNR-CCA    HEALTH CARE PROXY: her son is her [address], Mr Amrita Bernal, [phone] (cellular) , [phone] (home)    AMBULATES: with walker    DOMICILED: lives alone has a care giver there during the days       Current Facility-Administered Medications   Medication Dose Route Frequency Provider Last Rate Last Dose    potassium chloride (KLOR-CON M) extended release tablet 40 mEq  40 mEq Oral PRN Ovidio Barnes MD   40 mEq at 10/01/20 4786    Or    potassium bicarb-citric acid (EFFER-K) effervescent tablet 40 mEq  40 mEq Oral PRN Ovidio Barnes MD        Or    potassium chloride 10 mEq/100 mL IVPB (Peripheral Line)  10 mEq Intravenous PRN Ovidio Barnes MD        magnesium sulfate 1 g in dextrose 5% 100 mL IVPB  1 g Intravenous PRN Ovidio Barnes MD   Stopped at 10/01/20 0746    potassium chloride (KLOR-CON M) extended release tablet 40 mEq  40 mEq Oral Once Shalini Oh MD        magnesium sulfate 1 g in dextrose 5% 100 mL IVPB  1 g Intravenous Once Shalini Oh MD        famotidine (PEPCID) tablet 20 mg  20 mg Oral Daily Katlin Brooks DO   20 mg at 10/01/20 0745    azithromycin (ZITHROMAX) tablet 250 mg  250 mg Oral Daily Shalini Oh MD   250 mg at 10/01/20 0745    pantoprazole (PROTONIX) tablet 40 mg  40 mg Oral BID AC Katlin Brooks, DO   40 mg at 10/01/20 1712    loperamide (IMODIUM) capsule 4 mg  4 mg Oral 4x Daily PRN Shira Doles, DO        amLODIPine (NORVASC) tablet 5 mg  5 mg Oral Daily Shira Doles, DO   5 mg at 10/01/20 0745    melatonin tablet 10 mg  10 mg Oral Daily Shira Doles, DO   10 mg at 10/01/20 1463    montelukast (SINGULAIR) tablet 10 mg  10 mg Oral Nightly Shira Doles, DO   10 mg at 09/30/20 1951    vitamin D (CHOLECALCIFEROL) capsule 5,000 Units  5,000 Units Oral Daily Shira Doles, DO   5,000 Units at 10/01/20 8741    vitamin D (ERGOCALCIFEROL) capsule 50,000 Units  50,000 Units Oral Weekly Shira Doles, DO   50,000 Units at 09/27/20 1053    zinc sulfate (ZINCATE) capsule 50 mg  50 mg Oral Daily Shira Doles, DO   50 mg at 10/01/20 0745    sucralfate (CARAFATE) tablet 1 g  1 g Oral 4 times per day Radha Humphreys MD   1 g at 10/01/20 1713    sodium chloride flush 0.9 % injection 10 mL  10 mL Intravenous 2 times per day Shira Doles, DO   10 mL at 10/01/20 0746    sodium chloride flush 0.9 % injection 10 mL  10 mL Intravenous PRN Shira Doles, DO        acetaminophen (TYLENOL) tablet 650 mg  650 mg Oral Q6H PRN Shira Doles, DO        Or    acetaminophen (TYLENOL) suppository 650 mg  650 mg Rectal Q6H PRN Shira Doles, DO        ondansetron (ZOFRAN-ODT) disintegrating tablet 4 mg  4 mg Oral Q8H PRN Shira Doles, DO        Or    ondansetron [facility]) injection 4 mg  4 mg Intravenous Q6H PRN Shira Doles, DO                 Objective:   /60   Pulse 74   Temp 97.1 °F (36.2 °C) (Axillary)   Resp 20   Ht 5' 3\" (1.6 m)   Wt 116 lb (52.6 kg)   SpO2 100%   BMI 20.55 kg/m²     Exam not performed in an effort to preserve PPE however case discussed in great detail with unit staff    Recent Labs     09/29/20  0519 09/30/20  0315 10/01/20  0230   WBC  --  13.7* 13.8*   RBC  --  3.21* 3.06*   HGB 12.1 9.8* 9.4*   HCT 35.3* 28.8* 27.5* MCV  --  89.7 89.9   MCH  --  30.5 30.7   MCHC  --  34.0 34.2   PLT  --  318 314     Recent Labs     09/29/20  0330 09/30/20  0220 10/01/20  0230   * 130* 128*   K 4.5 3.9 3.1*   ANIONGAP 15 12 15   CL 91* 94* 90*   CO2 23 24 23   BUN 9 7* 6*   CREATININE 0.6 0.6 0.5   GLUCOSE 119* 100 79   CALCIUM 9.0 8.9 8.9     Recent Labs     10/01/20  0230   MG 1.4*     No results for input(s): AST, ALT, ALB, BILITOT, ALKPHOS, ALB in the last 72 hours. No results for input(s): PH, PO2, PCO2, HCO3, BE, O2SAT in the last 72 hours. No results for input(s): TROPONINI in the last 72 hours. No results for input(s): INR in the last 72 hours. No results for input(s): LACTA in the last 72 hours. Intake/Output Summary (Last 24 hours) at 10/1/2020 1751  Last data filed at 10/1/2020 1203  Gross per 24 hour   Intake 80 ml   Output --   Net 80 ml       No results found.      Assessment and Plan:   GI bleed  GI following  EGD demonstrated large duodenal ulcer  Patient counseled on Motrin usage  Protonix/sucralfate  Follow hemoglobin     COVID-19 infection  Diagnosed via screening test prior to EGD  Remains asymptomatic  Supportive care  Infection control following     Moderate malnutrition  Dietitian recs followed     DVT prophylaxis  SCDs     Social work following for discharge planning, family debating home versus facility    Sangita Echevarria MD   10/1/2020 5:51 PM

## 2020-10-01 NOTE — PROGRESS NOTES
Spoke to the pt's son Emilee Grajeda over the phone, pt's status updated. The son states he is the POA and primary decision maker, pt's daughter should not be the one making the discharge plan decision. Pt needs to go the nursing facility per Emilee Grajeda, he will contact the  [facility]ro[facility] himself for further discussion.

## 2020-10-01 NOTE — CARE COORDINATION
Spoke with pt's dtr, Ashly Del Rosario, and explained dc options for the pt recommending SNF therapy rehab upon dc. Dtr states is agreeable to therapy rehab but very adamant will not place the pt in SNF for LTC. SW suggested a referral to either [facility] or [facility] who can provide services to covid+ pts and determine bed availability and services. If the pt improves prior to dc, the referral or offer for services can be declined and home services established for the pt's dc. Dtr was familiar with [facility] and agreed to consider rehab services for dc. Dtr verbalized understanding for dc planning and requested to speak fruther with all siblings and have a decision for SW tomorrow. SW provided contact information.

## 2020-10-02 LAB
ANION GAP SERPL CALCULATED.3IONS-SCNC: 16 MMOL/L (ref 7–19)
BASOPHILS ABSOLUTE: 0.1 K/UL (ref 0–0.2)
BASOPHILS RELATIVE PERCENT: 0.6 % (ref 0–1)
BUN BLDV-MCNC: 8 MG/DL (ref 8–23)
CALCIUM SERPL-MCNC: 9 MG/DL (ref 8.2–9.6)
CHLORIDE BLD-SCNC: 94 MMOL/L (ref 98–111)
CO2: 20 MMOL/L (ref 22–29)
CREAT SERPL-MCNC: 0.5 MG/DL (ref 0.5–0.9)
EOSINOPHILS ABSOLUTE: 0.2 K/UL (ref 0–0.6)
EOSINOPHILS RELATIVE PERCENT: 1.3 % (ref 0–5)
GFR AFRICAN AMERICAN: >59
GFR NON-AFRICAN AMERICAN: >60
GLUCOSE BLD-MCNC: 83 MG/DL (ref 74–109)
HCT VFR BLD CALC: 31.2 % (ref 37–47)
HEMOGLOBIN: 10.4 G/DL (ref 12–16)
IMMATURE GRANULOCYTES #: 0.1 K/UL
LYMPHOCYTES ABSOLUTE: 3.3 K/UL (ref 1.1–4.5)
LYMPHOCYTES RELATIVE PERCENT: 19.7 % (ref 20–40)
MAGNESIUM: 1.8 MG/DL (ref 1.7–2.3)
MCH RBC QN AUTO: 30.8 PG (ref 27–31)
MCHC RBC AUTO-ENTMCNC: 33.3 G/DL (ref 33–37)
MCV RBC AUTO: 92.3 FL (ref 81–99)
MONOCYTES ABSOLUTE: 1.8 K/UL (ref 0–0.9)
MONOCYTES RELATIVE PERCENT: 10.8 % (ref 0–10)
NEUTROPHILS ABSOLUTE: 11.1 K/UL (ref 1.5–7.5)
NEUTROPHILS RELATIVE PERCENT: 66.9 % (ref 50–65)
PDW BLD-RTO: 12.9 % (ref 11.5–14.5)
PLATELET # BLD: 255 K/UL (ref 130–400)
PLATELET SLIDE REVIEW: ADEQUATE
PMV BLD AUTO: 10.7 FL (ref 9.4–12.3)
POTASSIUM SERPL-SCNC: 3.6 MMOL/L (ref 3.5–5)
RBC # BLD: 3.38 M/UL (ref 4.2–5.4)
SODIUM BLD-SCNC: 130 MMOL/L (ref 136–145)
WBC # BLD: 16.5 K/UL (ref 4.8–10.8)

## 2020-10-02 PROCEDURE — 2580000003 HC RX 258: Performed by: INTERNAL MEDICINE

## 2020-10-02 PROCEDURE — 85025 COMPLETE CBC W/AUTO DIFF WBC: CPT

## 2020-10-02 PROCEDURE — 2100000000 HC CCU R&B

## 2020-10-02 PROCEDURE — 80048 BASIC METABOLIC PNL TOTAL CA: CPT

## 2020-10-02 PROCEDURE — 6370000000 HC RX 637 (ALT 250 FOR IP): Performed by: INTERNAL MEDICINE

## 2020-10-02 PROCEDURE — 83735 ASSAY OF MAGNESIUM: CPT

## 2020-10-02 RX ADMIN — SUCRALFATE 1 G: 1 TABLET ORAL at 05:53

## 2020-10-02 RX ADMIN — AMLODIPINE BESYLATE 5 MG: 5 TABLET ORAL at 07:57

## 2020-10-02 RX ADMIN — SUCRALFATE 1 G: 1 TABLET ORAL at 17:08

## 2020-10-02 RX ADMIN — FAMOTIDINE 20 MG: 20 TABLET ORAL at 07:56

## 2020-10-02 RX ADMIN — Medication 5000 UNITS: at 07:56

## 2020-10-02 RX ADMIN — SODIUM CHLORIDE, PRESERVATIVE FREE 10 ML: 5 INJECTION INTRAVENOUS at 21:37

## 2020-10-02 RX ADMIN — MONTELUKAST SODIUM 10 MG: 10 TABLET, FILM COATED ORAL at 21:37

## 2020-10-02 RX ADMIN — Medication 10 MG: at 07:57

## 2020-10-02 RX ADMIN — AZITHROMYCIN DIHYDRATE 250 MG: 250 TABLET, FILM COATED ORAL at 07:56

## 2020-10-02 RX ADMIN — ZINC SULFATE 220 MG (50 MG) CAPSULE 50 MG: CAPSULE at 07:56

## 2020-10-02 RX ADMIN — PANTOPRAZOLE SODIUM 40 MG: 40 TABLET, DELAYED RELEASE ORAL at 05:53

## 2020-10-02 RX ADMIN — PANTOPRAZOLE SODIUM 40 MG: 40 TABLET, DELAYED RELEASE ORAL at 17:07

## 2020-10-02 RX ADMIN — SODIUM CHLORIDE, PRESERVATIVE FREE 10 ML: 5 INJECTION INTRAVENOUS at 07:57

## 2020-10-02 RX ADMIN — SUCRALFATE 1 G: 1 TABLET ORAL at 13:21

## 2020-10-02 ASSESSMENT — PAIN SCALES - GENERAL
PAINLEVEL_OUTOF10: 0
PAINLEVEL_OUTOF10: 0

## 2020-10-02 NOTE — CARE COORDINATION
Spoke with son who confirms decision to take pt home and sister is going to care for the pt in pt's home 24/7. If unable to mange pt's care then intend to request services through [facility] in [facility]. SW explained when pt is expected to dc will assist with all services and DME necessary at that time. P/C RN Akila Taylor is also assisting with discussion for possible hospice services.

## 2020-10-03 LAB
ANION GAP SERPL CALCULATED.3IONS-SCNC: 13 MMOL/L (ref 7–19)
BASOPHILS ABSOLUTE: 0.1 K/UL (ref 0–0.2)
BASOPHILS RELATIVE PERCENT: 0.7 % (ref 0–1)
BUN BLDV-MCNC: 6 MG/DL (ref 8–23)
CALCIUM SERPL-MCNC: 9.2 MG/DL (ref 8.2–9.6)
CHLORIDE BLD-SCNC: 94 MMOL/L (ref 98–111)
CO2: 25 MMOL/L (ref 22–29)
CREAT SERPL-MCNC: 0.5 MG/DL (ref 0.5–0.9)
EOSINOPHILS ABSOLUTE: 0.3 K/UL (ref 0–0.6)
EOSINOPHILS RELATIVE PERCENT: 1.9 % (ref 0–5)
GFR AFRICAN AMERICAN: >59
GFR NON-AFRICAN AMERICAN: >60
GLUCOSE BLD-MCNC: 82 MG/DL (ref 74–109)
HCT VFR BLD CALC: 32 % (ref 37–47)
HEMOGLOBIN: 10.8 G/DL (ref 12–16)
IMMATURE GRANULOCYTES #: 0.1 K/UL
LYMPHOCYTES ABSOLUTE: 2.5 K/UL (ref 1.1–4.5)
LYMPHOCYTES RELATIVE PERCENT: 15.9 % (ref 20–40)
MAGNESIUM: 1.6 MG/DL (ref 1.7–2.3)
MCH RBC QN AUTO: 31.2 PG (ref 27–31)
MCHC RBC AUTO-ENTMCNC: 33.8 G/DL (ref 33–37)
MCV RBC AUTO: 92.5 FL (ref 81–99)
MONOCYTES ABSOLUTE: 1.7 K/UL (ref 0–0.9)
MONOCYTES RELATIVE PERCENT: 10.8 % (ref 0–10)
NEUTROPHILS ABSOLUTE: 10.8 K/UL (ref 1.5–7.5)
NEUTROPHILS RELATIVE PERCENT: 70 % (ref 50–65)
PDW BLD-RTO: 13.2 % (ref 11.5–14.5)
PLATELET # BLD: 359 K/UL (ref 130–400)
PMV BLD AUTO: 10.2 FL (ref 9.4–12.3)
POTASSIUM SERPL-SCNC: 3 MMOL/L (ref 3.5–5)
RBC # BLD: 3.46 M/UL (ref 4.2–5.4)
SODIUM BLD-SCNC: 132 MMOL/L (ref 136–145)
WBC # BLD: 15.5 K/UL (ref 4.8–10.8)

## 2020-10-03 PROCEDURE — 6370000000 HC RX 637 (ALT 250 FOR IP): Performed by: INTERNAL MEDICINE

## 2020-10-03 PROCEDURE — 6370000000 HC RX 637 (ALT 250 FOR IP): Performed by: HOSPITALIST

## 2020-10-03 PROCEDURE — 83735 ASSAY OF MAGNESIUM: CPT

## 2020-10-03 PROCEDURE — 80048 BASIC METABOLIC PNL TOTAL CA: CPT

## 2020-10-03 PROCEDURE — 85025 COMPLETE CBC W/AUTO DIFF WBC: CPT

## 2020-10-03 PROCEDURE — 2580000003 HC RX 258: Performed by: INTERNAL MEDICINE

## 2020-10-03 PROCEDURE — 2100000000 HC CCU R&B

## 2020-10-03 RX ORDER — LABETALOL HYDROCHLORIDE 5 MG/ML
20 INJECTION, SOLUTION INTRAVENOUS EVERY 4 HOURS PRN
Status: DISCONTINUED | OUTPATIENT
Start: 2020-10-03 | End: 2020-10-06 | Stop reason: HOSPADM

## 2020-10-03 RX ORDER — LANOLIN ALCOHOL/MO/W.PET/CERES
200 CREAM (GRAM) TOPICAL ONCE
Status: COMPLETED | OUTPATIENT
Start: 2020-10-03 | End: 2020-10-03

## 2020-10-03 RX ORDER — LISINOPRIL 20 MG/1
20 TABLET ORAL DAILY
Status: DISCONTINUED | OUTPATIENT
Start: 2020-10-03 | End: 2020-10-06 | Stop reason: HOSPADM

## 2020-10-03 RX ORDER — MAGNESIUM SULFATE 1 G/100ML
1 INJECTION INTRAVENOUS ONCE
Status: DISCONTINUED | OUTPATIENT
Start: 2020-10-03 | End: 2020-10-04

## 2020-10-03 RX ADMIN — SUCRALFATE 1 G: 1 TABLET ORAL at 12:02

## 2020-10-03 RX ADMIN — AZITHROMYCIN DIHYDRATE 250 MG: 250 TABLET, FILM COATED ORAL at 08:27

## 2020-10-03 RX ADMIN — SODIUM CHLORIDE, PRESERVATIVE FREE 10 ML: 5 INJECTION INTRAVENOUS at 08:28

## 2020-10-03 RX ADMIN — ZINC SULFATE 220 MG (50 MG) CAPSULE 50 MG: CAPSULE at 08:26

## 2020-10-03 RX ADMIN — SUCRALFATE 1 G: 1 TABLET ORAL at 00:22

## 2020-10-03 RX ADMIN — PANTOPRAZOLE SODIUM 40 MG: 40 TABLET, DELAYED RELEASE ORAL at 17:13

## 2020-10-03 RX ADMIN — Medication 5000 UNITS: at 08:26

## 2020-10-03 RX ADMIN — AMLODIPINE BESYLATE 5 MG: 5 TABLET ORAL at 08:27

## 2020-10-03 RX ADMIN — POTASSIUM CHLORIDE 40 MEQ: 1500 TABLET, EXTENDED RELEASE ORAL at 08:27

## 2020-10-03 RX ADMIN — MONTELUKAST SODIUM 10 MG: 10 TABLET, FILM COATED ORAL at 21:31

## 2020-10-03 RX ADMIN — FAMOTIDINE 20 MG: 20 TABLET ORAL at 08:26

## 2020-10-03 RX ADMIN — Medication 10 MG: at 08:27

## 2020-10-03 RX ADMIN — SUCRALFATE 1 G: 1 TABLET ORAL at 17:13

## 2020-10-03 RX ADMIN — SUCRALFATE 1 G: 1 TABLET ORAL at 23:54

## 2020-10-03 RX ADMIN — PANTOPRAZOLE SODIUM 40 MG: 40 TABLET, DELAYED RELEASE ORAL at 08:26

## 2020-10-03 RX ADMIN — Medication 200 MG: at 21:30

## 2020-10-03 ASSESSMENT — PAIN SCALES - GENERAL
PAINLEVEL_OUTOF10: 0

## 2020-10-03 NOTE — PROGRESS NOTES
Hospitalist Progress Note  [facility]     Patient: Cece Mckee  : 1929  MRN: [id number]  Code Status: [facility]    Hospital Day: 6   Date of Service: 10/2/2020    Subjective:   Pt seen in covid-19 unit. No current complaints. Past Medical History:   Diagnosis Date    Acid reflux     Hypertension     Macular degeneration     Dry Type    OA (osteoarthritis)     knees    Palliative care patient 2020       Past Surgical History:   Procedure Laterality Date    ANKLE SURGERY Right     CARPAL TUNNEL RELEASE Left     CHOLECYSTECTOMY      TOTAL HIP ARTHROPLASTY Bilateral     TUBAL LIGATION      UPPER GASTROINTESTINAL ENDOSCOPY  2020    Dr [place] poster wall duodenal ulcer in the setting of Motrin       Family History   Problem Relation Age of Onset    Diabetes Mother     Cancer Father         prostate metastatic    Cancer Sister         breast    Glaucoma Son     Elevated Lipids Son     Arthritis Daughter     Elevated Lipids Daughter        Social History     Socioeconomic History    Marital status:       Spouse name: Not on file    Number of children: 5    Years of education: Not on file    Highest education level: Not on file   Occupational History    Occupation: house wife   Social Needs    Financial resource strain: Not on file    Food insecurity     Worry: Not on file     Inability: Not on file   [employer] needs     Medical: Not on file     Non-medical: Not on file   Tobacco Use    Smoking status: Former Smoker    Smokeless tobacco: Never Used    Tobacco comment: \"smoked a few ciggaerttes once and a while\" as per daughter, Tana Flores won't tell that\" as per her son   Substance and Sexual Activity    Alcohol use: Yes     Comment: rarely has a sip of wine    Drug use: Never    Sexual activity: Not on file     Comment: had 9 kids over ten and a half years   Lifestyle    Physical activity     Days per week: Not on file Minutes per session: Not on file    Stress: Not on file   Relationships    Social connections     Talks on phone: Not on file     Gets together: Not on file     Attends Congregation service: Not on file     Active member of club or organization: Not on file     Attends meetings of clubs or organizations: Not on file     Relationship status: Not on file    Intimate partner violence     Fear of current or ex partner: Not on file     Emotionally abused: Not on file     Physically abused: Not on file     Forced sexual activity: Not on file   Other Topics Concern    Not on file   Social History Narrative    CODE STATUS: DNR-CCA    HEALTH CARE PROXY: her son is her [address], Mr Lemons Speaker, [phone] (cellular) , [phone] (home)    AMBULATES: with walker    DOMICILED: lives alone has a care giver there during the days       Current Facility-Administered Medications   Medication Dose Route Frequency Provider Last Rate Last Dose    potassium chloride (KLOR-CON M) extended release tablet 40 mEq  40 mEq Oral LESLIE Pagan MD   40 mEq at 10/01/20 6643    Or    potassium bicarb-citric acid (EFFER-K) effervescent tablet 40 mEq  40 mEq Oral PRN Uzma Pagan MD        Or    potassium chloride 10 mEq/100 mL IVPB (Peripheral Line)  10 mEq Intravenous LESLIE Pagan MD        magnesium sulfate 1 g in dextrose 5% 100 mL IVPB  1 g Intravenous LESLIE Pagan MD   Stopped at 10/01/20 0746    potassium chloride (KLOR-CON M) extended release tablet 40 mEq  40 mEq Oral Once Sania Crespo MD        magnesium sulfate 1 g in dextrose 5% 100 mL IVPB  1 g Intravenous Once Sania Crespo MD        lactated ringers infusion   Intravenous Continuous Sania Crespo MD 50 mL/hr at 10/01/20 1817      famotidine (PEPCID) tablet 20 mg  20 mg Oral Daily Lilliana Hamilton DO   20 mg at 10/02/20 0756    azithromycin (ZITHROMAX) tablet 250 mg  250 mg Oral Daily Sania Crespo MD   250 mg at 10/02/20 0754    pantoprazole (PROTONIX) tablet 40 mg  40 mg Oral BID AC Jamal Moses, DO   40 mg at 10/02/20 1707    loperamide (IMODIUM) capsule 4 mg  4 mg Oral 4x Daily PRN Jamal Moses, DO        amLODIPine (NORVASC) tablet 5 mg  5 mg Oral Daily Jamal Moses, DO   5 mg at 10/02/20 0757    melatonin tablet 10 mg  10 mg Oral Daily Jamal Moses, DO   10 mg at 10/02/20 0757    montelukast (SINGULAIR) tablet 10 mg  10 mg Oral Nightly Jamal Moses, DO   10 mg at 10/02/20 2137    vitamin D (CHOLECALCIFEROL) capsule 5,000 Units  5,000 Units Oral Daily Jamal Moses, DO   5,000 Units at 10/02/20 6385    vitamin D (ERGOCALCIFEROL) capsule 50,000 Units  50,000 Units Oral Weekly Jamal Moses, DO   50,000 Units at 09/27/20 1053    zinc sulfate (ZINCATE) capsule 50 mg  50 mg Oral Daily Jamal Moses, DO   50 mg at 10/02/20 0756    sucralfate (CARAFATE) tablet 1 g  1 g Oral 4 times per day Ramiro Perkins MD   1 g at 10/02/20 1708    sodium chloride flush 0.9 % injection 10 mL  10 mL Intravenous 2 times per day Jamal Moses, DO   10 mL at 10/02/20 2137    sodium chloride flush 0.9 % injection 10 mL  10 mL Intravenous PRN Jamal Moses, DO        acetaminophen (TYLENOL) tablet 650 mg  650 mg Oral Q6H PRN Jamal Moses, DO        Or    acetaminophen (TYLENOL) suppository 650 mg  650 mg Rectal Q6H PRN Jamal Moses, DO        ondansetron (ZOFRAN-ODT) disintegrating tablet 4 mg  4 mg Oral Q8H PRN Jamal Moses, DO        Or    ondansetron [facility]) injection 4 mg  4 mg Intravenous Q6H PRN Jamal Moses, DO             lactated ringers 50 mL/hr at 10/01/20 1817        Objective:   BP (!) 144/64   Pulse 82   Temp 97.4 °F (36.3 °C)   Resp 20   Ht 5' 3\" (1.6 m)   Wt 115 lb 9.6 oz (52.4 kg)   SpO2 100%   BMI 20.48 kg/m²     Exam not performed in an effort to preserve PPE however case discussed in detail with unit staff    Recent Labs 09/30/20  0315 10/01/20  0230 10/02/20  0312   WBC 13.7* 13.8* 16.5*   RBC 3.21* 3.06* 3.38*   HGB 9.8* 9.4* 10.4*   HCT 28.8* 27.5* 31.2*   MCV 89.7 89.9 92.3   MCH 30.5 30.7 30.8   MCHC 34.0 34.2 33.3    314 255     Recent Labs     09/30/20  0220 10/01/20  0230 10/02/20  0312   * 128* 130*   K 3.9 3.1* 3.6   ANIONGAP 12 15 16   CL 94* 90* 94*   CO2 24 23 20*   BUN 7* 6* 8   CREATININE 0.6 0.5 0.5   GLUCOSE 100 79 83   CALCIUM 8.9 8.9 9.0     Recent Labs     10/01/20  0230 10/02/20  0312   MG 1.4* 1.8     No results for input(s): AST, ALT, ALB, BILITOT, ALKPHOS, ALB in the last 72 hours. No results for input(s): PH, PO2, PCO2, HCO3, BE, O2SAT in the last 72 hours. No results for input(s): TROPONINI in the last 72 hours. No results for input(s): INR in the last 72 hours. No results for input(s): LACTA in the last 72 hours. Intake/Output Summary (Last 24 hours) at 10/2/2020 2322  Last data filed at 10/2/2020 1700  Gross per 24 hour   Intake 1350 ml   Output --   Net 1350 ml       No results found.      Assessment and Plan:   GI bleed  GI following  EGD demonstrated large duodenal ulcer  Patient counseled on Motrin usage  Protonix/sucralfate  Follow hemoglobin     COVID-19 infection  Diagnosed via screening test prior to EGD  Remains asymptomatic  Supportive care  Infection control following     Moderate malnutrition  Dietitian recs followed     DVT prophylaxis  SCDs     Social work following for discharge planning, family debating home versus facility    Davian Mar MD   10/2/2020 11:22 PM

## 2020-10-03 NOTE — PROGRESS NOTES
Pt screaming out that she needs her wires removed an that she wants to speak to the dr and she wants to go home. Pt also requesting to be \"knocked in the head\" pt states she wants to \"get out of here and never come back\" pt also stated  \"I Just want to die and get it over with already. \"  Attempted to try to comfort pt but pt continues to voice her displeasure with being in the hospital.   Adjusted pts leads and assessed pts needs. No needs voiced or identified. Cont to monitor.   Electronically signed by Michi Perez RN on 10/2/2020 at 7:57 PM

## 2020-10-03 NOTE — PROGRESS NOTES
Resting quietly with eyes closed. Cont to monitor.   Electronically signed by Galileo West RN on 10/2/2020 at 8:28 PM 25-Feb-2020 19:00

## 2020-10-03 NOTE — PROGRESS NOTES
Hospitalist Progress Note  [facility]     Patient: Dalia Or  : 1929  MRN: [id number]  Code Status: [facility]    Hospital Day: 7   Date of Service: 10/3/2020    Subjective:   Pt seen in COVID-19 unit. No current complaints. Past Medical History:   Diagnosis Date    Acid reflux     Hypertension     Macular degeneration     Dry Type    OA (osteoarthritis)     knees    Palliative care patient 2020       Past Surgical History:   Procedure Laterality Date    ANKLE SURGERY Right     CARPAL TUNNEL RELEASE Left     CHOLECYSTECTOMY      TOTAL HIP ARTHROPLASTY Bilateral     TUBAL LIGATION      UPPER GASTROINTESTINAL ENDOSCOPY  2020    Dr Pito Leigh poster wall duodenal ulcer in the setting of Motrin       Family History   Problem Relation Age of Onset    Diabetes Mother     Cancer Father         prostate metastatic    Cancer Sister         breast    Glaucoma Son     Elevated Lipids Son     Arthritis Daughter     Elevated Lipids Daughter        Social History     Socioeconomic History    Marital status:       Spouse name: Not on file    Number of children: 5    Years of education: Not on file    Highest education level: Not on file   Occupational History    Occupation: house wife   Social Needs    Financial resource strain: Not on file    Food insecurity     Worry: Not on file     Inability: Not on file   [employer] needs     Medical: Not on file     Non-medical: Not on file   Tobacco Use    Smoking status: Former Smoker    Smokeless tobacco: Never Used    Tobacco comment: \"smoked a few ciggaerttes once and a while\" as per daughter, Rafa Menchaca won't tell that\" as per her son   Substance and Sexual Activity    Alcohol use: Yes     Comment: rarely has a sip of wine    Drug use: Never    Sexual activity: Not on file     Comment: had 9 kids over ten and a half years   Lifestyle    Physical activity     Days per week: Not on file Minutes per session: Not on file    Stress: Not on file   Relationships    Social connections     Talks on phone: Not on file     Gets together: Not on file     Attends Jew service: Not on file     Active member of club or organization: Not on file     Attends meetings of clubs or organizations: Not on file     Relationship status: Not on file    Intimate partner violence     Fear of current or ex partner: Not on file     Emotionally abused: Not on file     Physically abused: Not on file     Forced sexual activity: Not on file   Other Topics Concern    Not on file   Social History Narrative    CODE STATUS: DNR-CCA    HEALTH CARE PROXY: her son is her [address], Mr Lester Clemente, [phone] (cellular) , [phone] (home)    AMBULATES: with walker    DOMICILED: lives alone has a care giver there during the days       Current Facility-Administered Medications   Medication Dose Route Frequency Provider Last Rate Last Dose    labetalol (NORMODYNE;TRANDATE) injection 20 mg  20 mg Intravenous Q4H PRN Dhruv Craft MD        lisinopril (PRINIVIL;ZESTRIL) tablet 20 mg  20 mg Oral Daily Dhruv Craft MD   Stopped at 10/03/20 1302    potassium chloride (KLOR-CON M) extended release tablet 40 mEq  40 mEq Oral PRN Tonya Gacria MD   40 mEq at 10/03/20 0827    Or    potassium bicarb-citric acid (EFFER-K) effervescent tablet 40 mEq  40 mEq Oral PRN Tonya Garcia MD        Or    potassium chloride 10 mEq/100 mL IVPB (Peripheral Line)  10 mEq Intravenous PRN Tonya Garcia MD        magnesium sulfate 1 g in dextrose 5% 100 mL IVPB  1 g Intravenous PRN Tonya Garcia MD   Stopped at 10/01/20 0746    potassium chloride (KLOR-CON M) extended release tablet 40 mEq  40 mEq Oral Once Dhruv Craft MD        magnesium sulfate 1 g in dextrose 5% 100 mL IVPB  1 g Intravenous Once Dhruv Craft MD        famotidine (PEPCID) tablet 20 mg  20 mg Oral Daily Norrine Bakes, DO   20 mg at 10/03/20 7586    pantoprazole (PROTONIX) tablet 40 mg  40 mg Oral BID AC Nancye Emmanuel, DO   40 mg at 10/03/20 1713    loperamide (IMODIUM) capsule 4 mg  4 mg Oral 4x Daily PRN Nancye Emmanuel, DO        amLODIPine (NORVASC) tablet 5 mg  5 mg Oral Daily Nancye Emmanuel, DO   5 mg at 10/03/20 0827    melatonin tablet 10 mg  10 mg Oral Daily Nancye Emmanuel, DO   10 mg at 10/03/20 0827    montelukast (SINGULAIR) tablet 10 mg  10 mg Oral Nightly Nancye Emmanuel, DO   10 mg at 10/02/20 2137    vitamin D (CHOLECALCIFEROL) capsule 5,000 Units  5,000 Units Oral Daily Nancye Emmanuel, DO   5,000 Units at 10/03/20 0073    vitamin D (ERGOCALCIFEROL) capsule 50,000 Units  50,000 Units Oral Weekly Nancye Emmanuel, DO   50,000 Units at 09/27/20 1053    zinc sulfate (ZINCATE) capsule 50 mg  50 mg Oral Daily Nancye Emmanuel, DO   50 mg at 10/03/20 2912    sucralfate (CARAFATE) tablet 1 g  1 g Oral 4 times per day Radha Lazcano MD   1 g at 10/03/20 1713    sodium chloride flush 0.9 % injection 10 mL  10 mL Intravenous 2 times per day Nancye Emmanuel, DO   10 mL at 10/03/20 0610    sodium chloride flush 0.9 % injection 10 mL  10 mL Intravenous PRN Nancye Emmanuel, DO        acetaminophen (TYLENOL) tablet 650 mg  650 mg Oral Q6H PRN Nancye Emmanuel, DO        Or    acetaminophen (TYLENOL) suppository 650 mg  650 mg Rectal Q6H PRN Nancye Emmanuel, DO        ondansetron (ZOFRAN-ODT) disintegrating tablet 4 mg  4 mg Oral Q8H PRN Nancye Emmanuel, DO        Or    ondansetron [facility]) injection 4 mg  4 mg Intravenous Q6H PRN Nancye Emmanuel, DO                 Objective:   /80   Pulse 94   Temp 97 °F (36.1 °C) (Axillary)   Resp 20   Ht 5' 3\" (1.6 m)   Wt 115 lb 9.6 oz (52.4 kg)   SpO2 96%   BMI 20.48 kg/m²     Exam not performed in an effort to preserve PPE however case discussed in detail with unit staff    Recent Labs     10/01/20  0230 10/02/20  0312 10/03/20  7325 WBC 13.8* 16.5* 15.5*   RBC 3.06* 3.38* 3.46*   HGB 9.4* 10.4* 10.8*   HCT 27.5* 31.2* 32.0*   MCV 89.9 92.3 92.5   MCH 30.7 30.8 31.2*   MCHC 34.2 33.3 33.8    255 359     Recent Labs     10/01/20  0230 10/02/20  0312 10/03/20  0430   * 130* 132*   K 3.1* 3.6 3.0*   ANIONGAP 15 16 13   CL 90* 94* 94*   CO2 23 20* 25   BUN 6* 8 6*   CREATININE 0.5 0.5 0.5   GLUCOSE 79 83 82   CALCIUM 8.9 9.0 9.2     Recent Labs     10/01/20  0230 10/02/20  0312 10/03/20  0430   MG 1.4* 1.8 1.6*     No results for input(s): AST, ALT, ALB, BILITOT, ALKPHOS, ALB in the last 72 hours. No results for input(s): PH, PO2, PCO2, HCO3, BE, O2SAT in the last 72 hours. No results for input(s): TROPONINI in the last 72 hours. No results for input(s): INR in the last 72 hours. No results for input(s): LACTA in the last 72 hours. Intake/Output Summary (Last 24 hours) at 10/3/2020 1800  Last data filed at 10/3/2020 1700  Gross per 24 hour   Intake --   Output 550 ml   Net -550 ml       No results found.      Assessment and Plan:   GI bleed  GI following  EGD demonstrated large duodenal ulcer  Patient counseled on Motrin usage  Protonix/sucralfate  Follow hemoglobin     COVID-19 infection  Diagnosed via screening test prior to EGD  Remains asymptomatic  Supportive care  Infection control following     Moderate malnutrition  Dietitian recs followed     DVT prophylaxis  SCDs    Dispo: Lengthy discussion with patient's daughter Gricelda Max who stated they wish to have home hospice services in place prior to the patient coming home, will reach out to [facility] and Social Work who have been following in this regard    Akosua Hickman MD   10/3/2020 6:00 PM

## 2020-10-04 LAB
ANION GAP SERPL CALCULATED.3IONS-SCNC: 13 MMOL/L (ref 7–19)
BASOPHILS ABSOLUTE: 0.1 K/UL (ref 0–0.2)
BASOPHILS RELATIVE PERCENT: 0.5 % (ref 0–1)
BUN BLDV-MCNC: 5 MG/DL (ref 8–23)
C DIFF TOXIN/ANTIGEN: NORMAL
CALCIUM SERPL-MCNC: 9 MG/DL (ref 8.2–9.6)
CHLORIDE BLD-SCNC: 95 MMOL/L (ref 98–111)
CO2: 24 MMOL/L (ref 22–29)
CREAT SERPL-MCNC: 0.4 MG/DL (ref 0.5–0.9)
EOSINOPHILS ABSOLUTE: 0.2 K/UL (ref 0–0.6)
EOSINOPHILS RELATIVE PERCENT: 1.7 % (ref 0–5)
GFR AFRICAN AMERICAN: >59
GFR NON-AFRICAN AMERICAN: >60
GLUCOSE BLD-MCNC: 90 MG/DL (ref 74–109)
HCT VFR BLD CALC: 30.6 % (ref 37–47)
HEMOGLOBIN: 10.7 G/DL (ref 12–16)
IMMATURE GRANULOCYTES #: 0.1 K/UL
LYMPHOCYTES ABSOLUTE: 2.1 K/UL (ref 1.1–4.5)
LYMPHOCYTES RELATIVE PERCENT: 15.8 % (ref 20–40)
MAGNESIUM: 1.5 MG/DL (ref 1.7–2.3)
MCH RBC QN AUTO: 31.5 PG (ref 27–31)
MCHC RBC AUTO-ENTMCNC: 35 G/DL (ref 33–37)
MCV RBC AUTO: 90 FL (ref 81–99)
MONOCYTES ABSOLUTE: 1.5 K/UL (ref 0–0.9)
MONOCYTES RELATIVE PERCENT: 11.3 % (ref 0–10)
NEUTROPHILS ABSOLUTE: 9.2 K/UL (ref 1.5–7.5)
NEUTROPHILS RELATIVE PERCENT: 70.2 % (ref 50–65)
PDW BLD-RTO: 13.2 % (ref 11.5–14.5)
PLATELET # BLD: 330 K/UL (ref 130–400)
PMV BLD AUTO: 10.4 FL (ref 9.4–12.3)
POTASSIUM SERPL-SCNC: 3.1 MMOL/L (ref 3.5–5)
RBC # BLD: 3.4 M/UL (ref 4.2–5.4)
SODIUM BLD-SCNC: 132 MMOL/L (ref 136–145)
WBC # BLD: 13.1 K/UL (ref 4.8–10.8)

## 2020-10-04 PROCEDURE — 6370000000 HC RX 637 (ALT 250 FOR IP): Performed by: INTERNAL MEDICINE

## 2020-10-04 PROCEDURE — 87324 CLOSTRIDIUM AG IA: CPT

## 2020-10-04 PROCEDURE — 87449 NOS EACH ORGANISM AG IA: CPT

## 2020-10-04 PROCEDURE — 83735 ASSAY OF MAGNESIUM: CPT

## 2020-10-04 PROCEDURE — 85025 COMPLETE CBC W/AUTO DIFF WBC: CPT

## 2020-10-04 PROCEDURE — 2100000000 HC CCU R&B

## 2020-10-04 PROCEDURE — 80048 BASIC METABOLIC PNL TOTAL CA: CPT

## 2020-10-04 PROCEDURE — 6370000000 HC RX 637 (ALT 250 FOR IP): Performed by: HOSPITALIST

## 2020-10-04 RX ORDER — FAMOTIDINE 20 MG/1
20 TABLET, FILM COATED ORAL 2 TIMES DAILY
Status: DISCONTINUED | OUTPATIENT
Start: 2020-10-04 | End: 2020-10-06 | Stop reason: HOSPADM

## 2020-10-04 RX ORDER — LANOLIN ALCOHOL/MO/W.PET/CERES
200 CREAM (GRAM) TOPICAL ONCE
Status: COMPLETED | OUTPATIENT
Start: 2020-10-04 | End: 2020-10-04

## 2020-10-04 RX ADMIN — ZINC SULFATE 220 MG (50 MG) CAPSULE 50 MG: CAPSULE at 09:04

## 2020-10-04 RX ADMIN — PANTOPRAZOLE SODIUM 40 MG: 40 TABLET, DELAYED RELEASE ORAL at 16:37

## 2020-10-04 RX ADMIN — SUCRALFATE 1 G: 1 TABLET ORAL at 12:50

## 2020-10-04 RX ADMIN — AMLODIPINE BESYLATE 5 MG: 5 TABLET ORAL at 09:04

## 2020-10-04 RX ADMIN — POTASSIUM CHLORIDE 40 MEQ: 1500 TABLET, EXTENDED RELEASE ORAL at 09:04

## 2020-10-04 RX ADMIN — PANTOPRAZOLE SODIUM 40 MG: 40 TABLET, DELAYED RELEASE ORAL at 07:37

## 2020-10-04 RX ADMIN — FAMOTIDINE 20 MG: 20 TABLET ORAL at 09:04

## 2020-10-04 RX ADMIN — Medication 200 MG: at 12:50

## 2020-10-04 RX ADMIN — SUCRALFATE 1 G: 1 TABLET ORAL at 22:51

## 2020-10-04 RX ADMIN — SUCRALFATE 1 G: 1 TABLET ORAL at 05:45

## 2020-10-04 RX ADMIN — SUCRALFATE 1 G: 1 TABLET ORAL at 16:36

## 2020-10-04 RX ADMIN — FAMOTIDINE 20 MG: 20 TABLET ORAL at 19:38

## 2020-10-04 RX ADMIN — MONTELUKAST SODIUM 10 MG: 10 TABLET, FILM COATED ORAL at 19:38

## 2020-10-04 RX ADMIN — Medication 5000 UNITS: at 09:04

## 2020-10-04 RX ADMIN — ERGOCALCIFEROL 50000 UNITS: 1.25 CAPSULE ORAL at 09:04

## 2020-10-04 RX ADMIN — Medication 10 MG: at 09:04

## 2020-10-04 ASSESSMENT — PAIN SCALES - GENERAL
PAINLEVEL_OUTOF10: 0

## 2020-10-04 NOTE — PROGRESS NOTES
Hospitalist Progress Note  [facility]     Patient: Nilda Tellez  : 1929  MRN: [id number]  Code Status: [facility]    Hospital Day: 8   Date of Service: 10/4/2020    Subjective:   Pt seen in COVID-19 unit. She asked me about discharge planning. All questions sought and answered. Past Medical History:   Diagnosis Date    Acid reflux     Hypertension     Macular degeneration     Dry Type    OA (osteoarthritis)     knees    Palliative care patient 2020       Past Surgical History:   Procedure Laterality Date    ANKLE SURGERY Right     CARPAL TUNNEL RELEASE Left     CHOLECYSTECTOMY      TOTAL HIP ARTHROPLASTY Bilateral     TUBAL LIGATION      UPPER GASTROINTESTINAL ENDOSCOPY  2020    Dr Willow Stewart poster wall duodenal ulcer in the setting of Motrin       Family History   Problem Relation Age of Onset    Diabetes Mother     Cancer Father         prostate metastatic    Cancer Sister         breast    Glaucoma Son     Elevated Lipids Son     Arthritis Daughter     Elevated Lipids Daughter        Social History     Socioeconomic History    Marital status:       Spouse name: Not on file    Number of children: 5    Years of education: Not on file    Highest education level: Not on file   Occupational History    Occupation: house wife   Social Needs    Financial resource strain: Not on file    Food insecurity     Worry: Not on file     Inability: Not on file   [employer] needs     Medical: Not on file     Non-medical: Not on file   Tobacco Use    Smoking status: Former Smoker    Smokeless tobacco: Never Used    Tobacco comment: \"smoked a few ciggaerttes once and a while\" as per daughter, Cyndi Stockton won't tell that\" as per her son   Substance and Sexual Activity    Alcohol use: Yes     Comment: rarely has a sip of wine    Drug use: Never    Sexual activity: Not on file     Comment: had 9 kids over ten and a half years   Lifestyle    Physical activity     Days per week: Not on file     Minutes per session: Not on file    Stress: Not on file   Relationships    Social connections     Talks on phone: Not on file     Gets together: Not on file     Attends [employer] service: Not on file     Active member of club or organization: Not on file     Attends meetings of clubs or organizations: Not on file     Relationship status: Not on file    Intimate partner violence     Fear of current or ex partner: Not on file     Emotionally abused: Not on file     Physically abused: Not on file     Forced sexual activity: Not on file   Other Topics Concern    Not on file   Social History Narrative    CODE STATUS: DNR-CCA    HEALTH CARE PROXY: her son is her [address], Mr Dickson Zhang, [phone] (cellular) , [phone] (home)    AMBULATES: with walker    DOMICILED: lives alone has a care giver there during the days       Current Facility-Administered Medications   Medication Dose Route Frequency Provider Last Rate Last Dose    famotidine (PEPCID) tablet 20 mg  20 mg Oral BID Marcus Petty DO   20 mg at 10/04/20 7531    labetalol (NORMODYNE;TRANDATE) injection 20 mg  20 mg Intravenous Q4H PRN Pieter Moore MD        lisinopril (PRINIVIL;ZESTRIL) tablet 20 mg  20 mg Oral Daily Pieter Moore MD   Stopped at 10/03/20 1302    magnesium sulfate 1 g in dextrose 5% 100 mL IVPB  1 g Intravenous Once Pieter Moore MD        potassium chloride (KLOR-CON M) extended release tablet 40 mEq  40 mEq Oral PRN Jessica Lemus MD   40 mEq at 10/04/20 8923    Or    potassium bicarb-citric acid (EFFER-K) effervescent tablet 40 mEq  40 mEq Oral PRN Jessica Lemus MD        Or    potassium chloride 10 mEq/100 mL IVPB (Peripheral Line)  10 mEq Intravenous PRN Jessica Lemus MD        magnesium sulfate 1 g in dextrose 5% 100 mL IVPB  1 g Intravenous PRN Jessica Lemus MD   Stopped at 10/01/20 0746    potassium chloride (KLOR-CON M) extended release tablet 40 mEq  40 mEq Oral Once Akosua Hickman MD        magnesium sulfate 1 g in dextrose 5% 100 mL IVPB  1 g Intravenous Once Akosua Hickman MD        pantoprazole (PROTONIX) tablet 40 mg  40 mg Oral BID AC Blima Brightly, DO   40 mg at 10/04/20 4093    loperamide (IMODIUM) capsule 4 mg  4 mg Oral 4x Daily PRN Blima Brightly, DO        amLODIPine (NORVASC) tablet 5 mg  5 mg Oral Daily Blima Brightly, DO   5 mg at 10/04/20 3159    melatonin tablet 10 mg  10 mg Oral Daily Blima Brightly, DO   10 mg at 10/04/20 6911    montelukast (SINGULAIR) tablet 10 mg  10 mg Oral Nightly Blima Brightly, DO   10 mg at 10/03/20 2131    vitamin D (CHOLECALCIFEROL) capsule 5,000 Units  5,000 Units Oral Daily Blima Brightly, DO   5,000 Units at 10/04/20 3307    vitamin D (ERGOCALCIFEROL) capsule 50,000 Units  50,000 Units Oral Weekly Blima Brightly, DO   50,000 Units at 10/04/20 0479    zinc sulfate (ZINCATE) capsule 50 mg  50 mg Oral Daily Blima Brightly, DO   50 mg at 10/04/20 2879    sucralfate (CARAFATE) tablet 1 g  1 g Oral 4 times per day Monet Power MD   1 g at 10/04/20 1250    sodium chloride flush 0.9 % injection 10 mL  10 mL Intravenous 2 times per day Blima Brightly, DO   Stopped at 10/04/20 7804    sodium chloride flush 0.9 % injection 10 mL  10 mL Intravenous PRN Blima Brightly, DO        acetaminophen (TYLENOL) tablet 650 mg  650 mg Oral Q6H PRN Blima Brightly, DO        Or    acetaminophen (TYLENOL) suppository 650 mg  650 mg Rectal Q6H PRN Blima Brightly, DO        ondansetron (ZOFRAN-ODT) disintegrating tablet 4 mg  4 mg Oral Q8H PRN Blima Brightly, DO        Or    ondansetron [facility]) injection 4 mg  4 mg Intravenous Q6H PRN Blima Brightly, DO                 Objective:   /72   Pulse 97   Temp 98 °F (36.7 °C) (Axillary)   Resp 18   Ht 5' 3\" (1.6 m)   Wt 115 lb 9.6 oz (52.4 kg)   SpO2 92%   BMI 20.48 kg/m²     Exam not performed in an effort to preserve PPE however case discussed in detail with unit staff    Recent Labs     10/02/20  0312 10/03/20  0430 10/04/20  0235   WBC 16.5* 15.5* 13.1*   RBC 3.38* 3.46* 3.40*   HGB 10.4* 10.8* 10.7*   HCT 31.2* 32.0* 30.6*   MCV 92.3 92.5 90.0   MCH 30.8 31.2* 31.5*   MCHC 33.3 33.8 35.0    359 330     Recent Labs     10/02/20  0312 10/03/20  0430 10/04/20  0235   * 132* 132*   K 3.6 3.0* 3.1*   ANIONGAP 16 13 13   CL 94* 94* 95*   CO2 20* 25 24   BUN 8 6* 5*   CREATININE 0.5 0.5 0.4*   GLUCOSE 83 82 90   CALCIUM 9.0 9.2 9.0     Recent Labs     10/02/20  0312 10/03/20  0430 10/04/20  0235   MG 1.8 1.6* 1.5*     No results for input(s): AST, ALT, ALB, BILITOT, ALKPHOS, ALB in the last 72 hours. No results for input(s): PH, PO2, PCO2, HCO3, BE, O2SAT in the last 72 hours. No results for input(s): TROPONINI in the last 72 hours. No results for input(s): INR in the last 72 hours. No results for input(s): LACTA in the last 72 hours. Intake/Output Summary (Last 24 hours) at 10/4/2020 1437  Last data filed at 10/4/2020 0600  Gross per 24 hour   Intake 850 ml   Output 750 ml   Net 100 ml       No results found.      Assessment and Plan:   GI bleed  GI following  EGD demonstrated large duodenal ulcer  Patient counseled on Motrin usage  Protonix/sucralfate  Follow hemoglobin  GI since signed off     COVID-19 infection  Diagnosed via screening test prior to EGD  Remains asymptomatic  Supportive care  Infection control following     Moderate malnutrition  Dietitian recs followed     DVT prophylaxis  SCDs     Dispo: Lengthy discussion with patient's daughter Darnell Brown who stated they wish to have home hospice services in place prior to the patient coming home, will reach out to [address] and Social Work who have been following in this regard    Sangita Echevarria MD   10/4/2020 2:37 PM

## 2020-10-04 NOTE — PROGRESS NOTES
Pt has had 4 liquid stools this shift. Her bottom is becoming red and beefy and is very sore. At times stool seeps out of rectum as soon as pt is cleansed and before a new pad can be placed. MD notified. He gave orders to screen for c-diff and okay to place flexiseal due to bottom irritation. Advised that unable to collect stool at this time due to it immediately soaking into pad. Will collect from flexiseal when able.     Electronically signed by Chetan Porter RN on 10/3/2020 at 11:12 PM

## 2020-10-04 NOTE — PLAN OF CARE
Problem: Airway Clearance - Ineffective  Goal: Achieve or maintain patent airway  Outcome: Met This Shift     Problem: Gas Exchange - Impaired  Goal: Absence of hypoxia  Outcome: Met This Shift  Goal: Promote optimal lung function  Outcome: Met This Shift     Problem: Breathing Pattern - Ineffective  Goal: Ability to achieve and maintain a regular respiratory rate  Outcome: Met This Shift     Problem: Body Temperature -  Risk of, Imbalanced  Goal: Ability to maintain a body temperature within defined limits  Outcome: Met This Shift  Goal: Will regain or maintain usual level of consciousness  Outcome: Met This Shift     Problem: Falls - Risk of:  Goal: Will remain free from falls  Description: Will remain free from falls  Outcome: Ongoing  Goal: Absence of physical injury  Description: Absence of physical injury  Outcome: Ongoing     Problem: Skin Integrity:  Goal: Will show no infection signs and symptoms  Description: Will show no infection signs and symptoms  Outcome: Ongoing  Goal: Absence of new skin breakdown  Description: Absence of new skin breakdown  Outcome: Ongoing     Problem:  Body Temperature -  Risk of, Imbalanced  Goal: Complications related to the disease process, condition or treatment will be avoided or minimized  Outcome: Ongoing     Problem: Isolation Precautions - Risk of Spread of Infection  Goal: Prevent transmission of infection  Outcome: Ongoing     Problem: Nutrition Deficits  Goal: Optimize nutrtional status  Outcome: Ongoing     Problem: Risk for Fluid Volume Deficit  Goal: Maintain normal heart rhythm  Outcome: Ongoing  Goal: Maintain absence of muscle cramping  Outcome: Ongoing  Goal: Maintain normal serum potassium, sodium, calcium, phosphorus, and pH  Outcome: Ongoing     Problem: Loneliness or Risk for Loneliness  Goal: Demonstrate positive use of time alone when socialization is not possible  Outcome: Ongoing     Problem: Fatigue  Goal: Verbalize increase energy and improved vitality  Outcome: Ongoing     Problem: Patient Education: Go to Patient Education Activity  Goal: Patient/Family Education  Outcome: Ongoing     Problem: Nutrition  Goal: Optimal nutrition therapy  Outcome: Ongoing     Problem: Restraint Use - Nonviolent/Non-Self-Destructive Behavior:  Goal: Absence of restraint indications  Description: Absence of restraint indications  Outcome: Completed  Goal: Absence of restraint-related injury  Description: Absence of restraint-related injury  Outcome: Completed

## 2020-10-05 LAB
ANION GAP SERPL CALCULATED.3IONS-SCNC: 10 MMOL/L (ref 7–19)
BASOPHILS ABSOLUTE: 0.1 K/UL (ref 0–0.2)
BASOPHILS RELATIVE PERCENT: 0.6 % (ref 0–1)
BUN BLDV-MCNC: 4 MG/DL (ref 8–23)
CALCIUM SERPL-MCNC: 9.3 MG/DL (ref 8.2–9.6)
CHLORIDE BLD-SCNC: 95 MMOL/L (ref 98–111)
CO2: 26 MMOL/L (ref 22–29)
CREAT SERPL-MCNC: 0.5 MG/DL (ref 0.5–0.9)
EOSINOPHILS ABSOLUTE: 0.2 K/UL (ref 0–0.6)
EOSINOPHILS RELATIVE PERCENT: 1.5 % (ref 0–5)
GFR AFRICAN AMERICAN: >59
GFR NON-AFRICAN AMERICAN: >60
GLUCOSE BLD-MCNC: 92 MG/DL (ref 74–109)
HCT VFR BLD CALC: 29.8 % (ref 37–47)
HEMOGLOBIN: 10.4 G/DL (ref 12–16)
IMMATURE GRANULOCYTES #: 0.1 K/UL
LYMPHOCYTES ABSOLUTE: 2.2 K/UL (ref 1.1–4.5)
LYMPHOCYTES RELATIVE PERCENT: 17 % (ref 20–40)
MAGNESIUM: 1.5 MG/DL (ref 1.7–2.3)
MCH RBC QN AUTO: 31.5 PG (ref 27–31)
MCHC RBC AUTO-ENTMCNC: 34.9 G/DL (ref 33–37)
MCV RBC AUTO: 90.3 FL (ref 81–99)
MONOCYTES ABSOLUTE: 1.2 K/UL (ref 0–0.9)
MONOCYTES RELATIVE PERCENT: 9.5 % (ref 0–10)
NEUTROPHILS ABSOLUTE: 9.2 K/UL (ref 1.5–7.5)
NEUTROPHILS RELATIVE PERCENT: 70.9 % (ref 50–65)
PDW BLD-RTO: 13.7 % (ref 11.5–14.5)
PLATELET # BLD: 334 K/UL (ref 130–400)
PMV BLD AUTO: 10.5 FL (ref 9.4–12.3)
POTASSIUM SERPL-SCNC: 3.3 MMOL/L (ref 3.5–5)
RBC # BLD: 3.3 M/UL (ref 4.2–5.4)
SODIUM BLD-SCNC: 131 MMOL/L (ref 136–145)
WBC # BLD: 13 K/UL (ref 4.8–10.8)

## 2020-10-05 PROCEDURE — 2580000003 HC RX 258: Performed by: INTERNAL MEDICINE

## 2020-10-05 PROCEDURE — 6370000000 HC RX 637 (ALT 250 FOR IP): Performed by: INTERNAL MEDICINE

## 2020-10-05 PROCEDURE — 80048 BASIC METABOLIC PNL TOTAL CA: CPT

## 2020-10-05 PROCEDURE — 83735 ASSAY OF MAGNESIUM: CPT

## 2020-10-05 PROCEDURE — 85025 COMPLETE CBC W/AUTO DIFF WBC: CPT

## 2020-10-05 PROCEDURE — 6370000000 HC RX 637 (ALT 250 FOR IP): Performed by: HOSPITALIST

## 2020-10-05 PROCEDURE — 2100000000 HC CCU R&B

## 2020-10-05 RX ORDER — LANOLIN ALCOHOL/MO/W.PET/CERES
200 CREAM (GRAM) TOPICAL ONCE
Status: COMPLETED | OUTPATIENT
Start: 2020-10-05 | End: 2020-10-05

## 2020-10-05 RX ORDER — MAGNESIUM SULFATE IN WATER 40 MG/ML
2 INJECTION, SOLUTION INTRAVENOUS ONCE
Status: DISCONTINUED | OUTPATIENT
Start: 2020-10-05 | End: 2020-10-05

## 2020-10-05 RX ADMIN — MONTELUKAST SODIUM 10 MG: 10 TABLET, FILM COATED ORAL at 19:17

## 2020-10-05 RX ADMIN — FAMOTIDINE 20 MG: 20 TABLET ORAL at 08:15

## 2020-10-05 RX ADMIN — Medication 10 MG: at 08:15

## 2020-10-05 RX ADMIN — ZINC SULFATE 220 MG (50 MG) CAPSULE 50 MG: CAPSULE at 08:16

## 2020-10-05 RX ADMIN — FAMOTIDINE 20 MG: 20 TABLET ORAL at 19:17

## 2020-10-05 RX ADMIN — Medication 200 MG: at 19:17

## 2020-10-05 RX ADMIN — AMLODIPINE BESYLATE 5 MG: 5 TABLET ORAL at 08:16

## 2020-10-05 RX ADMIN — PANTOPRAZOLE SODIUM 40 MG: 40 TABLET, DELAYED RELEASE ORAL at 17:12

## 2020-10-05 RX ADMIN — SUCRALFATE 1 G: 1 TABLET ORAL at 05:46

## 2020-10-05 RX ADMIN — SUCRALFATE 1 G: 1 TABLET ORAL at 17:12

## 2020-10-05 RX ADMIN — SUCRALFATE 1 G: 1 TABLET ORAL at 12:54

## 2020-10-05 RX ADMIN — Medication 5000 UNITS: at 08:15

## 2020-10-05 RX ADMIN — LOPERAMIDE HYDROCHLORIDE 4 MG: 2 CAPSULE ORAL at 19:17

## 2020-10-05 RX ADMIN — PANTOPRAZOLE SODIUM 40 MG: 40 TABLET, DELAYED RELEASE ORAL at 05:47

## 2020-10-05 RX ADMIN — LISINOPRIL 20 MG: 20 TABLET ORAL at 08:16

## 2020-10-05 RX ADMIN — POTASSIUM CHLORIDE 40 MEQ: 1500 TABLET, EXTENDED RELEASE ORAL at 05:46

## 2020-10-05 RX ADMIN — SODIUM CHLORIDE, PRESERVATIVE FREE 10 ML: 5 INJECTION INTRAVENOUS at 08:18

## 2020-10-05 ASSESSMENT — PAIN SCALES - GENERAL
PAINLEVEL_OUTOF10: 0

## 2020-10-05 NOTE — CARE COORDINATION
Spoke with patient's daughter, Jade Beatty, by phone regarding MD orders for [facility] services. She was agreeable and did not have a preference of [facility] agencies. Patient is out of [facility]. Called [facility] . Spoke with Amelia Cadena. They will not be able to accept due to no coverage in patient's area. Only have a certain number of COVID nurses. Then called [facility] of [place] and spoke with Marion De La Rosa. Was told to fax referral for them to review and they will get back with me. [facility]   [facility]  [phone]. The Patient  was provided with a choice of provider and agrees with the discharge plan. [x] Yes [] No    Freedom of choice list was provided with basic dialogue that supports the patient's individualized plan of care/goals, treatment preferences and shares the quality data associated with the providers.  [x] Yes [] No  Electronically signed by Maninder Gonzalez RN on 10/5/20 at 3:15 PM CDT

## 2020-10-05 NOTE — PROGRESS NOTES
Call placed again to daughter, Alanna Hudson. Pt does not qualify for hospice services at this time, they would like to have home health care follow. And we discussed, that if pt gets home and declines they can certainly have her evaluated again for hospice care.     Electronically signed by Kristen De Santiago RN on 10/5/2020 at 12:02 PM

## 2020-10-05 NOTE — PROGRESS NOTES
Follow up:    Call to pt's son Danielle Vieira who is POA. Unable to reach him and left a msg. Spoke with daughter Elo Yu that they still plan to bring pt home. They would like hospice if pt qualifies and home health if she does not. They welcome any services available. Will review for hospice appropriateness and enter consult if pt qualifies. Family prepared for pt to come home today.     Electronically signed by Crispin Gleason RN on 10/5/2020 at 11:03 AM

## 2020-10-05 NOTE — PLAN OF CARE
Problem: Falls - Risk of:  Goal: Will remain free from falls  Description: Will remain free from falls  Outcome: Ongoing  Goal: Absence of physical injury  Description: Absence of physical injury  Outcome: Ongoing     Problem: Skin Integrity:  Goal: Will show no infection signs and symptoms  Description: Will show no infection signs and symptoms  Outcome: Ongoing  Goal: Absence of new skin breakdown  Description: Absence of new skin breakdown  Outcome: Ongoing     Problem: Airway Clearance - Ineffective  Goal: Achieve or maintain patent airway  Outcome: Ongoing     Problem: Gas Exchange - Impaired  Goal: Absence of hypoxia  Outcome: Ongoing  Goal: Promote optimal lung function  Outcome: Ongoing     Problem: Breathing Pattern - Ineffective  Goal: Ability to achieve and maintain a regular respiratory rate  Outcome: Ongoing     Problem:  Body Temperature -  Risk of, Imbalanced  Goal: Ability to maintain a body temperature within defined limits  Outcome: Ongoing  Goal: Will regain or maintain usual level of consciousness  Outcome: Ongoing  Goal: Complications related to the disease process, condition or treatment will be avoided or minimized  Outcome: Ongoing     Problem: Isolation Precautions - Risk of Spread of Infection  Goal: Prevent transmission of infection  Outcome: Ongoing     Problem: Nutrition Deficits  Goal: Optimize nutrtional status  Outcome: Ongoing     Problem: Risk for Fluid Volume Deficit  Goal: Maintain normal heart rhythm  Outcome: Ongoing  Goal: Maintain absence of muscle cramping  Outcome: Ongoing  Goal: Maintain normal serum potassium, sodium, calcium, phosphorus, and pH  Outcome: Ongoing     Problem: Loneliness or Risk for Loneliness  Goal: Demonstrate positive use of time alone when socialization is not possible  Outcome: Ongoing     Problem: Fatigue  Goal: Verbalize increase energy and improved vitality  Outcome: Ongoing     Problem: Patient Education: Go to Patient Education Activity  Goal: Patient/Family Education  Outcome: Ongoing     Problem: Nutrition  Goal: Optimal nutrition therapy  Outcome: Ongoing

## 2020-10-05 NOTE — CARE COORDINATION
Informed by P/C RN Dom Rebollar the pt does not meet hospice qualifications and has spoken with the pt's Dtr who is assuming care upon dc. Spoke with pt's RN who confirmed and to enter [facility] order for services upon dc.

## 2020-10-06 VITALS
OXYGEN SATURATION: 99 % | WEIGHT: 115.6 LBS | HEART RATE: 72 BPM | SYSTOLIC BLOOD PRESSURE: 109 MMHG | RESPIRATION RATE: 18 BRPM | HEIGHT: 63 IN | DIASTOLIC BLOOD PRESSURE: 39 MMHG | TEMPERATURE: 97.1 F | BODY MASS INDEX: 20.48 KG/M2

## 2020-10-06 LAB
ANION GAP SERPL CALCULATED.3IONS-SCNC: 9 MMOL/L (ref 7–19)
BASOPHILS ABSOLUTE: 0.1 K/UL (ref 0–0.2)
BASOPHILS RELATIVE PERCENT: 0.8 % (ref 0–1)
BUN BLDV-MCNC: 6 MG/DL (ref 8–23)
CALCIUM SERPL-MCNC: 9.3 MG/DL (ref 8.2–9.6)
CHLORIDE BLD-SCNC: 97 MMOL/L (ref 98–111)
CO2: 27 MMOL/L (ref 22–29)
CREAT SERPL-MCNC: 0.5 MG/DL (ref 0.5–0.9)
EOSINOPHILS ABSOLUTE: 0.3 K/UL (ref 0–0.6)
EOSINOPHILS RELATIVE PERCENT: 2.8 % (ref 0–5)
GFR AFRICAN AMERICAN: >59
GFR NON-AFRICAN AMERICAN: >60
GLUCOSE BLD-MCNC: 97 MG/DL (ref 74–109)
HCT VFR BLD CALC: 30.9 % (ref 37–47)
HEMOGLOBIN: 10.2 G/DL (ref 12–16)
IMMATURE GRANULOCYTES #: 0 K/UL
LYMPHOCYTES ABSOLUTE: 2.7 K/UL (ref 1.1–4.5)
LYMPHOCYTES RELATIVE PERCENT: 25.5 % (ref 20–40)
MAGNESIUM: 1.6 MG/DL (ref 1.7–2.3)
MCH RBC QN AUTO: 30.6 PG (ref 27–31)
MCHC RBC AUTO-ENTMCNC: 33 G/DL (ref 33–37)
MCV RBC AUTO: 92.8 FL (ref 81–99)
MONOCYTES ABSOLUTE: 1.2 K/UL (ref 0–0.9)
MONOCYTES RELATIVE PERCENT: 11.5 % (ref 0–10)
NEUTROPHILS ABSOLUTE: 6.2 K/UL (ref 1.5–7.5)
NEUTROPHILS RELATIVE PERCENT: 59 % (ref 50–65)
PDW BLD-RTO: 14 % (ref 11.5–14.5)
PLATELET # BLD: 330 K/UL (ref 130–400)
PMV BLD AUTO: 10.2 FL (ref 9.4–12.3)
POTASSIUM SERPL-SCNC: 3.7 MMOL/L (ref 3.5–5)
RBC # BLD: 3.33 M/UL (ref 4.2–5.4)
SODIUM BLD-SCNC: 133 MMOL/L (ref 136–145)
WBC # BLD: 10.4 K/UL (ref 4.8–10.8)

## 2020-10-06 PROCEDURE — 6370000000 HC RX 637 (ALT 250 FOR IP): Performed by: INTERNAL MEDICINE

## 2020-10-06 PROCEDURE — 2580000003 HC RX 258: Performed by: INTERNAL MEDICINE

## 2020-10-06 PROCEDURE — 80048 BASIC METABOLIC PNL TOTAL CA: CPT

## 2020-10-06 PROCEDURE — 83735 ASSAY OF MAGNESIUM: CPT

## 2020-10-06 PROCEDURE — 85025 COMPLETE CBC W/AUTO DIFF WBC: CPT

## 2020-10-06 RX ORDER — LANOLIN ALCOHOL/MO/W.PET/CERES
400 CREAM (GRAM) TOPICAL ONCE
Status: DISCONTINUED | OUTPATIENT
Start: 2020-10-06 | End: 2020-10-06 | Stop reason: HOSPADM

## 2020-10-06 RX ORDER — AMLODIPINE BESYLATE 5 MG/1
5 TABLET ORAL DAILY
Qty: 30 TABLET | Refills: 3 | Status: SHIPPED | OUTPATIENT
Start: 2020-10-07

## 2020-10-06 RX ORDER — SUCRALFATE 1 G/1
1 TABLET ORAL 4 TIMES DAILY
Qty: 120 TABLET | Refills: 0 | Status: SHIPPED | OUTPATIENT
Start: 2020-10-06 | End: 2020-10-27 | Stop reason: SDUPTHER

## 2020-10-06 RX ORDER — PANTOPRAZOLE SODIUM 40 MG/1
40 TABLET, DELAYED RELEASE ORAL
Qty: 60 TABLET | Refills: 0 | Status: SHIPPED | OUTPATIENT
Start: 2020-10-06 | End: 2020-10-27 | Stop reason: SDUPTHER

## 2020-10-06 RX ORDER — MONTELUKAST SODIUM 10 MG/1
10 TABLET ORAL NIGHTLY
Qty: 30 TABLET | Refills: 3 | Status: SHIPPED | OUTPATIENT
Start: 2020-10-06

## 2020-10-06 RX ADMIN — PANTOPRAZOLE SODIUM 40 MG: 40 TABLET, DELAYED RELEASE ORAL at 05:03

## 2020-10-06 RX ADMIN — Medication 10 MG: at 08:18

## 2020-10-06 RX ADMIN — SUCRALFATE 1 G: 1 TABLET ORAL at 00:21

## 2020-10-06 RX ADMIN — ZINC SULFATE 220 MG (50 MG) CAPSULE 50 MG: CAPSULE at 08:18

## 2020-10-06 RX ADMIN — AMLODIPINE BESYLATE 5 MG: 5 TABLET ORAL at 08:18

## 2020-10-06 RX ADMIN — LISINOPRIL 20 MG: 20 TABLET ORAL at 08:20

## 2020-10-06 RX ADMIN — Medication 5000 UNITS: at 08:18

## 2020-10-06 RX ADMIN — SODIUM CHLORIDE, PRESERVATIVE FREE 10 ML: 5 INJECTION INTRAVENOUS at 08:22

## 2020-10-06 RX ADMIN — FAMOTIDINE 20 MG: 20 TABLET ORAL at 08:18

## 2020-10-06 RX ADMIN — SUCRALFATE 1 G: 1 TABLET ORAL at 11:45

## 2020-10-06 RX ADMIN — SUCRALFATE 1 G: 1 TABLET ORAL at 04:53

## 2020-10-06 NOTE — DISCHARGE SUMMARY
Hospitalist Discharge Summary  [facility]    Patient: Guerline Jacobson  : 1929  MRN: [id number]  Code Status: DNR-CC  PCP: Arnaldo Raines MD  Attending: Shalini Oh MD  Admission Date: 2020 10:43 PM  Discharge Date:  10/6/2020  Length of Stay: 10 days    Discharge Diagnoses:   GI bleed  COVID-19 positivity  Moderate malnutrition    Discharge Medications:      Com[facility], [address] Medication Instructions SIS:[id number]    Printed on:10/06/20 1552   Medication Information                      amLODIPine (NORVASC) 5 MG tablet  Take 1 tablet by mouth daily             benazepril (LOTENSIN) 20 MG tablet  Take 20 mg by mouth nightly             Cholecalciferol (D3-50 PO)  Take 25 mg by mouth daily             montelukast (SINGULAIR) 10 MG tablet  Take 1 tablet by mouth nightly             Multiple Vitamins-Minerals (CENTRUM SILVER PO)  Take by mouth             pantoprazole (PROTONIX) 40 MG tablet  Take 1 tablet by mouth 2 times daily (before meals)             sucralfate (CARAFATE) 1 GM tablet  Take 1 tablet by mouth 4 times daily                 Discharge Instructions: Activity: activity as tolerated    Recommended Follow Up:  [facility]  [place] [id number] [phone]  Schedule an appointment as soon as possible for a visit      Arnaldo Raines MD  [place] [phone]    Schedule an appointment as soon as possible for a visit      [facility] EMERGENCY DEPT  Replaced by [facility]  [phone]  Go to  As needed, If symptoms worsen      Future Appointments Scheduled at Time of Discharge:  No future appointments.      Hospital Course:   GI bleed  Resolved  GI following  EGD demonstrated large duodenal ulcer  Patient/family counseled on Motrin usage  Protonix/sucralfate  Follow hemoglobin  GI since signed off     COVID-19 positivity  Remains asymptomatic  Diagnosed via screening test prior to EGD  Infection control has given complete instructions to patient/family after discharge  Infection control states [facility] will contact patient/family after discharge     Moderate malnutrition  Dietitian recs followed     Dispo  Patient does not qualify for home hospice  Son (power of ) adamant for patient to discharge to home today  Patient medically stable for discharge to home today  Home health care was pending however son stated there was no need for this  Social work/palliative care/ following    Pt clinically back to baseline  Pt denies any complaints currently  Pt/family adamant for discharge  GI states pt stable for discharge  Pt medically stable for discharge  Pt/family aware to f/u with gi and her pcp as an outpt  Pt/family aware to return to er immediately with any concerning signs or symptoms    Discharge Exam:   BP (!) 109/39   Pulse 72   Temp 97.1 °F (36.2 °C) (Oral)   Resp 18   Ht 5' 3\" (1.6 m)   Wt 115 lb 9.6 oz (52.4 kg)   SpO2 99%   BMI 20.48 kg/m²     Recent Labs     10/04/20  0235 10/05/20  0255 10/06/20  0050   WBC 13.1* 13.0* 10.4   HGB 10.7* 10.4* 10.2*    334 330     Recent Labs     10/04/20  0235 10/05/20  0255 10/06/20  0050   * 131* 133*   K 3.1* 3.3* 3.7   CL 95* 95* 97*   CO2 24 26 27   BUN 5* 4* 6*   CREATININE 0.4* 0.5 0.5   GLUCOSE 90 92 97     No results for input(s): AST, ALT, ALB, BILITOT, ALKPHOS in the last 72 hours. Troponin T: No results for input(s): TROPONINI in the last 72 hours. Pro-BNP: No results for input(s): BNP in the last 72 hours. INR: No results for input(s): INR in the last 72 hours. UA:No results for input(s): NITRITE, COLORU, PHUR, LABCAST, WBCUA, RBCUA, MUCUS, TRICHOMONAS, YEAST, BACTERIA, CLARITYU, SPECGRAV, LEUKOCYTESUR, UROBILINOGEN, BILIRUBINUR, BLOODU, GLUCOSEU, AMORPHOUS in the last 72 hours.   A1C: No results for input(s): LABA1C in the last 72 hours.  ABG:No results for input(s): PHART, GCE4HBI, PO2ART, BLB6QIF, BEART, HGBAE, A5BGFJGO, CARBOXHGBART in the last 72 hours. No results found. Condition on Discharge: stable  Discharge Disposition: Home    Time Spent on Discharge: 40 minutes were spent in patient examination, evaluation, counseling, medication reconciliation, prescriptions for required medications, discharge plan, and follow up.     Yuli Wynn MD  10/6/2020 2:57 PM

## 2020-10-06 NOTE — PROGRESS NOTES
Spoke with Patient's daughter Sharyn Eric" over the phone for updates throughout the night. If patient is discharged today, call edi and she will come pick patient up or she will call her sister Ciara Cruz to come pick patient up.     Electronically signed by Hossein Phillips on 10/6/2020 at 6:14 AM

## 2020-10-06 NOTE — PROGRESS NOTES
DC teaching went over with pt and Son who was here to pick her up. All questions answered. I explained that the health dept will contact them and let them know when isolation is over. I also explained the importance of mask and cleaning/disinfecting areas. Also explained that they will need to call the numbers listed for follow up appt. I tried to make appt with her primary and could get no answer and GI said to have pt call for appt as they need to set her up for video call and need info from her phone. Pt has her glasses and dentures and a ring.

## 2020-10-06 NOTE — PROGRESS NOTES
Spoke to pts Bebe Royal on the phone and he wants pt DC today. Explained that home Health would not be available to see pt until Friday per [place] nurse. Son stated he understood and was looking to take her to [facility] soon. Dr Taina Gomez notified and will DC pt today.

## 2020-10-06 NOTE — CARE COORDINATION
Have spoke with Janice Davalos at [facility]. She said they could accept but will not be able to see until Friday due to staffing issues. I then called [facility]. and spoke with Komal Saunders. He said he would have to check and see if they could take. If they are able to accept, their policy is to see COVID patients 1 x a week, at the end of the day, at the end of the week. Neither agencies have bath aides available. Rastafari said they do send OT to [facility] in the place of bath aide. Will be sending referral to [facility] as this is the best offer available at this point. [facility]  [phone]  Fax  [phone].   Electronically signed by Daniele Johansen RN on 10/6/20 at 11:16 AM CDT The patient was discharged home by Dr Kian Nuñez in stable condition. The patient is alert and oriented, in no respiratory distress and discharge vital signs obtained. The patient's diagnosis, condition and treatment were explained. The patient expressed understanding. A discharge plan has been developed. Aftercare instructions were given. Pt ambulatory out of the ED.

## 2020-10-07 ENCOUNTER — CARE COORDINATION (OUTPATIENT)
Dept: CASE MANAGEMENT | Age: 85
End: 2020-10-07

## 2020-10-07 ENCOUNTER — TELEPHONE (OUTPATIENT)
Dept: GASTROENTEROLOGY | Age: 85
End: 2020-10-07

## 2020-10-07 NOTE — CARE COORDINATION
for initial COVID follow up after discharge call. She says patient is still asleep. Did med review. She is calling today to schedule HFU with PCP and GI. She says she has not heard from health department yet regarding when quarantine ends, but they should be contacting her. Advised if they did not hear anything today, to give local HD a call. She says last night she ate an egg and drank a glass of milk. She has been drinking Glucerna also. She says no cough, or SOA. [facility] Rm is to come out on Friday to admit patient, they only come once per week end of the day, end of the week, per [facility] Liaison notes. She says right now she is doing ok, they have been quoted as saying patient is going to SNF when quarantine is over, but she says that she and her sister will be caring for patient at home and as long as she does well, they will do so, and SNF is last resort. Discussed COVID precautions, CDC guidelines. She says she and sister have tested negative so far. Encouraged to continue to take precautions. Discussed COVID calls and follow up and she is accepting. Will follow up at a later time. No future appointments.     Harper Nicole RN

## 2020-10-07 NOTE — TELEPHONE ENCOUNTER
[place] daughter called to schedule a np/ hfu.     Please be advised that the best time to call her to accommodate their needs is Anytime. Thank you.

## 2020-10-14 ENCOUNTER — CARE COORDINATION (OUTPATIENT)
Dept: CASE MANAGEMENT | Age: 85
End: 2020-10-14

## 2020-10-14 NOTE — CARE COORDINATION
[facility] Transitions Follow Up Call    10/14/2020    Patient: [place]  Patient : 1929   MRN: [id number]  Reason for Admission:   Discharge Date: 10/6/20 RARS: Readmission Risk Score: 9          Spoke with: Lacey Erazo, patient's daughter    Needs to be reviewed by the provider   Additional needs identified to be addressed with provider No  none  Discussed COVID-19 related testing which was available at this time. Test results were positive. Patient informed of results, if available? Yes         Method of communication with provider : none    Care Transition Nurse (CTN) contacted the family by telephone to follow up after admission. Verified name with family as identifiers. Addressed changes since last contact: symptom management-SOA, pain  Discharged needs reviewed: none  Follow up appointment completed? Yes    Advance Care Planning:   Does patient have an Advance Directive:  not on file. CTN reviewed discharge instructions, medical action plan and red flags with family and discussed any barriers to care and/or understanding of plan of care after discharge. Discussed appropriate site of care based on symptoms and resources available to patient including: PCP. The family agrees to contact the PCP office for questions related to their healthcare. Patients top risk factors for readmission: functional cognitive ability, functional physical ability and medical condition  Interventions to address risk factors: discussed changing abx if she is still symptomatic    Discussed follow-up appointments. If no appointment was previously scheduled, appointment scheduling offered: Yes Is follow up appointment scheduled within 7 days of discharge? Yes  Non-[facility] follow up appointment(s):     Plan for follow-up call in 5-7 days based on severity of symptoms and risk factors. Plan for next call: symptom management-symptoms from UTI  CTN provided contact information for future needs.           Care Transitions Subsequent and Final Call    Subsequent and Final Calls  Have your medications changed?:  Yes  Patient Reports:  see cc note  Do you have any questions related to your medications?:  No  Do you currently have any active services?:  Yes  Are you currently active with any services?:  Home Health  Do you have any needs or concerns that I can assist you with?:  No  Identified Barriers:  None  Care Transitions Interventions  Other Interventions: Follow Up : Spoke with Swathijavier Fernandes today for follow up COVID call. She says patient has had her HFU with PCP and she has a UTI. She has been started on Cephalexin on Monday, not much change in frequency. Advised if not better by tomorrow to give the PCP a call and they may have culture back and be able to change the antibiotic. She says that patient is not mobile, in bed and sleeping a lot. She says that she is eating and she is pushing fluids, as she really does not want to drink. She says no issue with her breathing, not SOA. Advised she might ask PCP for something for bladder spasms as that might help in her wanting to get up to the [facility] so much. She says she will. Instructed to call prn with needs. Will follow up at a later time.    Future Appointments   Date Time Provider Alisa Soliz   10/27/2020  2:20 PM VALENTE Cates - NP [facility]       Gabe Sparks RN

## 2020-10-21 ENCOUNTER — CARE COORDINATION (OUTPATIENT)
Dept: CASE MANAGEMENT | Age: 85
End: 2020-10-21

## 2020-10-21 NOTE — CARE COORDINATION
[facility] Transitions Follow Up Call    10/21/2020    Patient: Rohan Petersen  Patient : 1929   MRN: [id number]    Discharge Date: 10/6/20 RARS: Readmission Risk Score: 9         Spoke with: Coleen Bosch, caregiver    Care Transitions Subsequent and Final Call    Subsequent and Final Calls  Have your medications changed?:  No  Do you have any questions related to your medications?:  No  Are you currently active with any services?:  Home Health  Do you have any needs or concerns that I can assist you with?:  No  Identified Barriers:  None  Care Transitions Interventions  Other Interventions:          Placed a call to the number listed for patient and spoke with Coleen Bosch, a caregiver. She reported that she has been with patient today. No one else is in the home. She said patient is quite tired, weak, but not really having any other issues that she is aware of at this time. She said she has a poor appetite, but will eat if it is placed before her. She is drinking some. Requires a great deal of assistance with ADLs. No other information shared. Will follow up as indicated. Needs to be reviewed by the provider   Additional needs identified to be addressed with provider No  none         Method of communication with provider : none    Care Transition Nurse (CTN) contacted the caregiver by telephone to follow up after admission. Verified name and  with caregiver as identifiers. Addressed changes since last contact: none  Discharged needs reviewed: none  Follow up appointment completed? Yes    Discussed appropriate site of care based on symptoms and resources available to patient including: PCP, Specialist, Urgent care clinics and When to call 911. The caregiver agrees to contact the PCP office for questions related to their healthcare.      Patients top risk factors for readmission: functional physical ability and medical condition  Interventions to address risk factors: Education of patient/family/caregiver/guardian to support self-management--  disease process mgmt, symptom mgmt, diet/hydration, medication mgmt, activity level, home safety needs, infection control, fall precautions, seeking medical attention, who/when to call prn any needs, etc.    Plan for follow-up call in 5-7 days based on severity of symptoms and risk factors. Plan for next call: - disease process mgmt, symptom mgmt, diet/hydration, medication mgmt, activity level, home safety needs, infection control, fall precautions, seeking medical attention, who/when to call prn any needs, etc.    CTN provided contact information for future needs.         Follow Up  Future Appointments   Date Time Provider Alisa Soliz   10/27/2020  2:20 PM VALENTE Bellamy - NP [facility]       Neema Pemberton RN

## 2020-10-27 ENCOUNTER — OFFICE VISIT (OUTPATIENT)
Dept: GASTROENTEROLOGY | Age: 85
End: 2020-10-27
Payer: MEDICARE

## 2020-10-27 VITALS
DIASTOLIC BLOOD PRESSURE: 70 MMHG | HEART RATE: 66 BPM | WEIGHT: 124 LBS | SYSTOLIC BLOOD PRESSURE: 125 MMHG | OXYGEN SATURATION: 96 % | HEIGHT: 63 IN | BODY MASS INDEX: 21.97 KG/M2

## 2020-10-27 DIAGNOSIS — K92.1 GASTROINTESTINAL HEMORRHAGE WITH MELENA: ICD-10-CM

## 2020-10-27 LAB
ANION GAP SERPL CALCULATED.3IONS-SCNC: 7 MMOL/L (ref 7–19)
BASOPHILS ABSOLUTE: 0.1 K/UL (ref 0–0.2)
BASOPHILS RELATIVE PERCENT: 1 % (ref 0–1)
BUN BLDV-MCNC: 8 MG/DL (ref 8–23)
CALCIUM SERPL-MCNC: 9.1 MG/DL (ref 8.2–9.6)
CHLORIDE BLD-SCNC: 104 MMOL/L (ref 98–111)
CO2: 29 MMOL/L (ref 22–29)
CREAT SERPL-MCNC: 0.5 MG/DL (ref 0.5–0.9)
EOSINOPHILS ABSOLUTE: 0.2 K/UL (ref 0–0.6)
EOSINOPHILS RELATIVE PERCENT: 3.4 % (ref 0–5)
GFR AFRICAN AMERICAN: >59
GFR NON-AFRICAN AMERICAN: >60
GLUCOSE BLD-MCNC: 115 MG/DL (ref 74–109)
HCT VFR BLD CALC: 33.2 % (ref 37–47)
HEMOGLOBIN: 10.5 G/DL (ref 12–16)
IMMATURE GRANULOCYTES #: 0 K/UL
LYMPHOCYTES ABSOLUTE: 1.4 K/UL (ref 1.1–4.5)
LYMPHOCYTES RELATIVE PERCENT: 20.4 % (ref 20–40)
MCH RBC QN AUTO: 31 PG (ref 27–31)
MCHC RBC AUTO-ENTMCNC: 31.6 G/DL (ref 33–37)
MCV RBC AUTO: 97.9 FL (ref 81–99)
MONOCYTES ABSOLUTE: 0.9 K/UL (ref 0–0.9)
MONOCYTES RELATIVE PERCENT: 12.7 % (ref 0–10)
NEUTROPHILS ABSOLUTE: 4.2 K/UL (ref 1.5–7.5)
NEUTROPHILS RELATIVE PERCENT: 62.2 % (ref 50–65)
PDW BLD-RTO: 15.9 % (ref 11.5–14.5)
PLATELET # BLD: 239 K/UL (ref 130–400)
PMV BLD AUTO: 11.2 FL (ref 9.4–12.3)
POTASSIUM SERPL-SCNC: 3.5 MMOL/L (ref 3.5–5)
RBC # BLD: 3.39 M/UL (ref 4.2–5.4)
SODIUM BLD-SCNC: 140 MMOL/L (ref 136–145)
WBC # BLD: 6.7 K/UL (ref 4.8–10.8)

## 2020-10-27 PROCEDURE — G8420 CALC BMI NORM PARAMETERS: HCPCS | Performed by: NURSE PRACTITIONER

## 2020-10-27 PROCEDURE — 1036F TOBACCO NON-USER: CPT | Performed by: NURSE PRACTITIONER

## 2020-10-27 PROCEDURE — 1123F ACP DISCUSS/DSCN MKR DOCD: CPT | Performed by: NURSE PRACTITIONER

## 2020-10-27 PROCEDURE — 1111F DSCHRG MED/CURRENT MED MERGE: CPT | Performed by: NURSE PRACTITIONER

## 2020-10-27 PROCEDURE — G8484 FLU IMMUNIZE NO ADMIN: HCPCS | Performed by: NURSE PRACTITIONER

## 2020-10-27 PROCEDURE — 99214 OFFICE O/P EST MOD 30 MIN: CPT | Performed by: NURSE PRACTITIONER

## 2020-10-27 PROCEDURE — 4040F PNEUMOC VAC/ADMIN/RCVD: CPT | Performed by: NURSE PRACTITIONER

## 2020-10-27 PROCEDURE — G8427 DOCREV CUR MEDS BY ELIG CLIN: HCPCS | Performed by: NURSE PRACTITIONER

## 2020-10-27 PROCEDURE — 1090F PRES/ABSN URINE INCON ASSESS: CPT | Performed by: NURSE PRACTITIONER

## 2020-10-27 RX ORDER — PANTOPRAZOLE SODIUM 40 MG/1
40 TABLET, DELAYED RELEASE ORAL
Qty: 60 TABLET | Refills: 1 | Status: SHIPPED | OUTPATIENT
Start: 2020-10-27

## 2020-10-27 RX ORDER — SOLIFENACIN SUCCINATE 5 MG/1
10 TABLET, FILM COATED ORAL DAILY
COMMUNITY

## 2020-10-27 RX ORDER — SUCRALFATE 1 G/1
1 TABLET ORAL 4 TIMES DAILY
Qty: 120 TABLET | Refills: 1 | Status: SHIPPED | OUTPATIENT
Start: 2020-10-27

## 2020-10-27 ASSESSMENT — ENCOUNTER SYMPTOMS
ANAL BLEEDING: 0
CONSTIPATION: 0
RECTAL PAIN: 0
VOMITING: 0
TROUBLE SWALLOWING: 0
BLOOD IN STOOL: 0
CHOKING: 0
ABDOMINAL PAIN: 0
ABDOMINAL DISTENTION: 0
DIARRHEA: 0
SHORTNESS OF BREATH: 0
COUGH: 0
NAUSEA: 0

## 2020-10-27 NOTE — PATIENT INSTRUCTIONS
Patient Education        pantoprazole (oral/injection)  Pronunciation:  pan TOE pra zole  Brand:  Protonix  What is the most important information I should know about pantoprazole? Pantoprazole can cause kidney problems. Tell your doctor if you are urinating less than usual, or if you have blood in your urine. Diarrhea may be a sign of a new infection. Call your doctor if you have diarrhea that is watery or has blood in it. Pantoprazole may cause new or worsening symptoms of lupus. Tell your doctor if you have joint pain and a skin rash on your cheeks or arms that worsens in sunlight. You may be more likely to have a broken bone while taking this medicine long term or more than once per day. What is pantoprazole? Pantoprazole is a proton pump inhibitor that decreases the amount of acid produced in the stomach. Pantoprazole is used to treat erosive esophagitis (damage to the esophagus from stomach acid caused by gastroesophageal reflux disease, or GERD) in adults and children who are at least 11years old. Pantoprazole is usually given for up to 8 weeks at a time while your esophagus heals. Pantoprazole is also used to treat Zollinger-Cote syndrome and other conditions involving excess stomach acid. Pantoprazole is not for immediate relief of heartburn. Pantoprazole may also be used for purposes not listed in this medication guide. What should I discuss with my healthcare provider before using pantoprazole? Heartburn can mimic early symptoms of a heart attack. Get emergency medical help if you have chest pain that spreads to your jaw or shoulder and you feel anxious or light-headed. You should not use this medicine if:  · you also take medicine that contains rilpivirine (Edurant, Shon, Kelley Sample); or  · you are allergic to pantoprazole or similar medicines (lansoprazole, omeprazole, Nexium, Prevacid, Prilosec, and others).   Tell your doctor if you have ever had:  · low levels of magnesium in your blood;  · lupus; or  · osteoporosis or low bone mineral density. You may be more likely to have a broken bone in your hip, wrist, or spine while taking a proton pump inhibitor long-term or more than once per day. Talk with your doctor about ways to keep your bones healthy. It is not known whether this medicine will harm an unborn baby. Tell your doctor if you are pregnant or plan to become pregnant. You should not breast-feed while using this medicine. Pantoprazole is not approved for use by anyone younger than 11years old. How should I use pantoprazole? Follow all directions on your prescription label and read all medication guides or instruction sheets. Use the medicine exactly as directed. Use the lowest dose for the shortest amount of time needed to treat your condition. Pantoprazole is taken by mouth (oral) or given as an infusion into a vein (injection). A healthcare provider may teach you how to properly use pantoprazole injection by yourself. Pantoprazole tablets are taken by mouth, with or without food. Pantoprazole oral granules should be taken 30 minutes before a meal.  Do not crush, chew, or break the tablet. Swallow it whole. The oral granules should be mixed with applesauce or apple juice and given either by mouth or through a nasogastric (NG) tube. Read and carefully follow any Instructions for Use provided with your medicine. Ask your doctor or pharmacist if you do not understand these instructions. Use this medicine for the full prescribed length of time, even if your symptoms quickly improve. Call your doctor if your symptoms do not improve or if they get worse while you are using this medicine. This medicine can affect the results of certain medical tests. Tell any doctor who treats you that you are using pantoprazole. Pantoprazole may also affect a drug-screening urine test and you may have false results. Tell the laboratory staff that you use this medicine.   Store this medicine at room temperature away from moisture, heat, and light. What happens if I miss a dose? Use the medicine as soon as you can, but skip the missed dose if it is almost time for your next dose. Do not use two doses at one time. What happens if I overdose? Seek emergency medical attention or call the Poison Help line at 1-974.664.9011. What should I avoid while using pantoprazole? This medicine can cause diarrhea, which may be a sign of a new infection. If you have diarrhea that is watery or bloody, call your doctor. Do not use anti-diarrhea medicine unless your doctor tells you to. What are the possible side effects of pantoprazole? Get emergency medical help if you have signs of an allergic reaction: hives; difficulty breathing; swelling of your face, lips, tongue, or throat. Call your doctor at once if you have:  · severe stomach pain, diarrhea that is watery or bloody;  · sudden pain or trouble moving your hip, wrist, or back;  · bruising or swelling where intravenous pantoprazole was injected;  · kidney problems --urinating less than usual, blood in your urine, swelling, rapid weight gain;  · low magnesium --dizziness, fast or irregular heart rate, tremors (shaking) or jerking muscle movements, feeling jittery, muscle cramps, muscle spasms in your hands and feet, cough or choking feeling; or  · new or worsening symptoms of lupus --joint pain, and a skin rash on your cheeks or arms that worsens in sunlight. Taking pantoprazole long-term may cause you to develop stomach growths called fundic gland polyps. Talk with your doctor about this risk. If you use pantoprazole for longer than 3 years, you could develop a vitamin B-12 deficiency. Talk to your doctor about how to manage this condition if you develop it. Common side effects may include:  · headache, dizziness;  · stomach pain, gas, nausea, vomiting, diarrhea;  · joint pain; or  · fever, rash, or cold symptoms (most common in children).   This is not a complete list of side effects and others may occur. Call your doctor for medical advice about side effects. You may report side effects to FDA at 2-745-LRS-0621. What other drugs will affect pantoprazole? Tell your doctor about all your other medicines, especially:  · digoxin;  · methotrexate; or  · a diuretic or \"water pill. \"  This list is not complete. Other drugs may affect pantoprazole, including prescription and over-the-counter medicines, vitamins, and herbal products. Not all possible drug interactions are listed here. Where can I get more information? Your pharmacist can provide more information about pantoprazole. Remember, keep this and all other medicines out of the reach of children, never share your medicines with others, and use this medication only for the indication prescribed. Every effort has been made to ensure that the information provided by [address] is accurate, up-to-date, and complete, but no guarantee is made to that effect. Drug information contained herein may be time sensitive. [employer] information has been compiled for use by healthcare practitioners and consumers in the [place] and therefore [employer] does not warrant that uses outside of the [place] are appropriate, unless specifically indicated otherwise. [employer]'s drug information does not endorse drugs, diagnose patients or recommend therapy. [employer] drug information is an informational resource designed to assist licensed healthcare practitioners in caring for their patients and/or to serve consumers viewing this service as a supplement to, and not a substitute for, the expertise, skill, knowledge and judgment of healthcare practitioners. The absence of a warning for a given drug or drug combination in no way should be construed to indicate that the drug or drug combination is safe, effective or appropriate for any given patient.  [employer] does not assume any responsibility for any aspect of healthcare administered with the aid of information [employer] provides. The information contained herein is not intended to cover all possible uses, directions, precautions, warnings, drug interactions, allergic reactions, or adverse effects. If you have questions about the drugs you are taking, check with your doctor, nurse or pharmacist.  Copyright 4757-0542 [address]. Version: 19.02. Revision date: 6/26/2018. Care instructions adapted under license by [facility] [place] ([facility]). If you have questions about a medical condition or this instruction, always ask your healthcare professional. [place] [address] any warranty or liability for your use of this information.

## 2020-10-27 NOTE — PROGRESS NOTES
Subjective:     Patient ID: Daniel Gomez is a 80 y.o. female  PCP: Jorge Helms MD  Referring Provider: No ref. provider found    HPI  Patient presents to the office today with the following complaints: New Patient (seen in hospital)      Pt here for hospital follow up. She was seen by Dr. Korina Vazquez while inpatient for GI bleed. [facility], anemia, and epigastric pain requiring ICU care. She had c/o dark stools for several weeks, however, prior to presenting to ER, she reported black tarry stool that turned the water red. Hgb dropped to 9.4 during admission. She underwent EGD on 9/27/2020 that revealed large duodenal ulcer. Today, she reports appetite is improving. Denies any black tarry stools, abdominal pain, nausea, and vomiting. Daughter is present during visit today. She is currently taking Carafate QID and Protonix 40 mg po BID. Last Hgb check couple of weeks ago per patient, and this had dropped to \"9 something. \"  She was put on po iron at that time by PCP. EGD 9/27/2020   IMPRESSION:  Large poster wall duodenal ulcer in the setting of Motrin  usage.   PLAN:  Continue PPI drip. Continue CCU monitoring. Add Carafate 1 gm  q.i.d. Serial H and H, transfuse if hemoglobin less than 7.5. If this  large lesion re-bleeds, she may need tertiary care referral for surgical  intervention and/or invasive radiology with embolization. This was my first time assessing Ms. Chatman    Assessment:     1. Gastrointestinal hemorrhage with melena    2.  Duodenal ulcer            Plan:   - Continue PPI and Carafate  - Labs today, check CMP and CBC   - Follow up in 6 weeks or sooner if needed, ok for VV.    - Consider repeat EGD for ulcer recheck  - No NSAIDs      Orders  Orders Placed This Encounter   Procedures    CBC Auto Differential     Standing Status:   Future     Number of Occurrences:   1     Standing Expiration Date: 10/27/2021    Basic Metabolic Panel     Standing Status:   Future     Number of Occurrences:   1     Standing Expiration Date:   10/27/2021     Medications  Orders Placed This Encounter   Medications    sucralfate (CARAFATE) 1 GM tablet     Sig: Take 1 tablet by mouth 4 times daily     Dispense:  120 tablet     Refill:  1    pantoprazole (PROTONIX) 40 MG tablet     Sig: Take 1 tablet by mouth 2 times daily (before meals)     Dispense:  60 tablet     Refill:  1         Patient History:     Past Medical History:   Diagnosis Date    Acid reflux     Hypertension     Macular degeneration     Dry Type    OA (osteoarthritis)     knees    Palliative care patient 09/28/2020       Past Surgical History:   Procedure Laterality Date    ANKLE SURGERY Right     CARPAL TUNNEL RELEASE Left     CHOLECYSTECTOMY      COLONOSCOPY  2008    normal per pt recall    TOTAL HIP ARTHROPLASTY Bilateral     TUBAL LIGATION      UPPER GASTROINTESTINAL ENDOSCOPY  09/28/2020    Dr Shyann Singh poster wall duodenal ulcer in the setting of Motrin       Family History   Problem Relation Age of Onset    Diabetes Mother     Cancer Father         prostate metastatic    Breast Cancer Sister     Glaucoma Son     Elevated Lipids Son     Arthritis Daughter     Elevated Lipids Daughter     Colon Cancer Neg Hx     Colon Polyps Neg Hx        Social History     Socioeconomic History    Marital status:       Spouse name: None    Number of children: 5    Years of education: None    Highest education level: None   Occupational History    Occupation: house wife   Social Needs    Financial resource strain: None    Food insecurity     Worry: None     Inability: None    Transportation needs     Medical: None     Non-medical: None   Tobacco Use    Smoking status: Former Smoker    Smokeless tobacco: Never Used    Tobacco comment: \"smoked a few ciggaerttes once and a while\" as per daughter, Walt Up won't tell that\" as per her son Substance and Sexual Activity    Alcohol use: Yes     Comment: rarely has a sip of wine    Drug use: Never    Sexual activity: None     Comment: had 9 kids over ten and a half years   Lifestyle    Physical activity     Days per week: None     Minutes per session: None    Stress: None   Relationships    Social connections     Talks on phone: None     Gets together: None     Attends Hoahaoism service: None     Active member of club or organization: None     Attends meetings of clubs or organizations: None     Relationship status: None    Intimate partner violence     Fear of current or ex partner: None     Emotionally abused: None     Physically abused: None     Forced sexual activity: None   Other Topics Concern    None   Social History Narrative    CODE STATUS: DNR-CCA    HEALTH CARE PROXY: her son is her [facility] [facility]. PoA, Mr Jose Downs, [phone] (cellular) , [phone] (home)    AMBULATES: with walker    DOMICILED: lives alone has a care giver there during the days       Current Outpatient Medications   Medication Sig Dispense Refill    solifenacin (VESICARE) 5 MG tablet Take 10 mg by mouth daily      Ferrous Sulfate (IRON PO) Take 1 tablet by mouth 2 times daily      sucralfate (CARAFATE) 1 GM tablet Take 1 tablet by mouth 4 times daily 120 tablet 1    pantoprazole (PROTONIX) 40 MG tablet Take 1 tablet by mouth 2 times daily (before meals) 60 tablet 1    montelukast (SINGULAIR) 10 MG tablet Take 1 tablet by mouth nightly 30 tablet 3    amLODIPine (NORVASC) 5 MG tablet Take 1 tablet by mouth daily 30 tablet 3    Multiple Vitamins-Minerals (CENTRUM SILVER PO) Take by mouth      Cholecalciferol (D3-50 PO) Take 25 mg by mouth daily      benazepril (LOTENSIN) 20 MG tablet Take 20 mg by mouth nightly       No current facility-administered medications for this visit. No Known Allergies    Review of Systems   Constitutional: Positive for appetite change (improving).  Negative for activity Status: She is alert and oriented to person, place, and time. Motor: Weakness (generalized) present. Gait: Gait abnormal (in wheelchair). Psychiatric:         Behavior: Behavior normal.         Thought Content:  Thought content normal.

## 2020-10-28 ENCOUNTER — TELEPHONE (OUTPATIENT)
Dept: GASTROENTEROLOGY | Age: 85
End: 2020-10-28

## 2020-10-28 ENCOUNTER — CARE COORDINATION (OUTPATIENT)
Dept: CASE MANAGEMENT | Age: 85
End: 2020-10-28

## 2020-10-28 NOTE — CARE COORDINATION
[facility] Transitions Follow Up Call    10/28/2020    Patient: [place]  Patient : 1929   MRN: [id number]    Discharge Date: 10/6/20 RARS: Readmission Risk Score: 9         Spoke with: [facility] Transitions Subsequent and Final Call    Subsequent and Final Calls  Do you have any ongoing symptoms?:  No  Have your medications changed?:  No  Do you have any questions related to your medications?:  No  Are you currently active with any services?:  Home Health  Do you have any needs or concerns that I can assist you with?:  No  Identified Barriers:  None  Care Transitions Interventions  Other Interventions:          Placed a call to the home and spoke with Irasema Sultana, caregiver. She reported that patient is a little tired today from a trip out of the home for a follow up appointment yesterday. She said she is feeling some better overall though. She said she is getting up and day to the bedside often. Her urine is better she said. She is not having any issues with shortness of breath, cough, congestion or other symptoms. She said she is eating and drinking well. She is sleeping well. She is not having any bowel or bladder issues. No new symptoms to report. Did discuss that this would be the last CTC call, to call if needed for anything. Voiced understanding. Needs to be reviewed by the provider   Additional needs identified to be addressed with provider No       Method of communication with provider : none    Care Transition Nurse (CTN) contacted the patient by telephone to follow up after admission. Verified name and  with patient as identifiers. Addressed changes since last contact: none  Discharged needs reviewed: none  Follow up appointment completed? Yes     Discussed appropriate site of care based on symptoms and resources available to patient including: PCP, Specialist, Urgent care clinics and When to call 911.  The patient agrees to contact the PCP office for questions related to their healthcare. Patients top risk factors for readmission: medical condition  Interventions to address risk factors: Education of patient/family/caregiver/guardian to support self-management-- disease process mgmt, symptom mgmt, diet/hydration, pain control needs, medication mgmt, activity level, home safety needs, infection control, fall precautions, seeking medical attention, who/when to call prn any needs, etc.    Discussed follow-up appointments. CTN provided contact information for future needs.           Follow Up  Future Appointments   Date Time Provider Alisa Soliz   12/8/2020  2:00 PM VALENTE Bellamy - NP [facility]       Neema Pemberton RN

## 2020-10-29 ENCOUNTER — LAB REQUISITION (OUTPATIENT)
Dept: LAB | Facility: HOSPITAL | Age: 85
End: 2020-10-29

## 2020-10-29 DIAGNOSIS — Z00.00 ENCOUNTER FOR GENERAL ADULT MEDICAL EXAMINATION WITHOUT ABNORMAL FINDINGS: ICD-10-CM

## 2020-10-29 LAB
BACTERIA UR QL AUTO: ABNORMAL /HPF
BILIRUB UR QL STRIP: NEGATIVE
CLARITY UR: ABNORMAL
COD CRY URNS QL: ABNORMAL /HPF
COLOR UR: YELLOW
GLUCOSE UR STRIP-MCNC: NEGATIVE MG/DL
HGB UR QL STRIP.AUTO: NEGATIVE
HYALINE CASTS UR QL AUTO: ABNORMAL /LPF
KETONES UR QL STRIP: NEGATIVE
LEUKOCYTE ESTERASE UR QL STRIP.AUTO: ABNORMAL
NITRITE UR QL STRIP: NEGATIVE
PH UR STRIP.AUTO: 7 [PH] (ref 5–8)
PROT UR QL STRIP: NEGATIVE
RBC # UR: ABNORMAL /HPF
REF LAB TEST METHOD: ABNORMAL
SP GR UR STRIP: 1.01 (ref 1–1.03)
SQUAMOUS #/AREA URNS HPF: ABNORMAL /HPF
UROBILINOGEN UR QL STRIP: ABNORMAL
WBC UR QL AUTO: ABNORMAL /HPF

## 2020-10-29 PROCEDURE — 81001 URINALYSIS AUTO W/SCOPE: CPT | Performed by: FAMILY MEDICINE

## 2020-10-29 PROCEDURE — 87088 URINE BACTERIA CULTURE: CPT | Performed by: FAMILY MEDICINE

## 2020-10-29 PROCEDURE — 87186 SC STD MICRODIL/AGAR DIL: CPT | Performed by: FAMILY MEDICINE

## 2020-10-29 PROCEDURE — 87077 CULTURE AEROBIC IDENTIFY: CPT | Performed by: FAMILY MEDICINE

## 2020-10-29 PROCEDURE — 87086 URINE CULTURE/COLONY COUNT: CPT | Performed by: FAMILY MEDICINE

## 2020-11-01 LAB
BACTERIA SPEC AEROBE CULT: ABNORMAL
BACTERIA SPEC AEROBE CULT: ABNORMAL

## 2020-12-08 ENCOUNTER — VIRTUAL VISIT (OUTPATIENT)
Dept: GASTROENTEROLOGY | Age: 85
End: 2020-12-08
Payer: MEDICARE

## 2020-12-08 PROCEDURE — G8420 CALC BMI NORM PARAMETERS: HCPCS | Performed by: NURSE PRACTITIONER

## 2020-12-08 PROCEDURE — G8428 CUR MEDS NOT DOCUMENT: HCPCS | Performed by: NURSE PRACTITIONER

## 2020-12-08 PROCEDURE — 99213 OFFICE O/P EST LOW 20 MIN: CPT | Performed by: NURSE PRACTITIONER

## 2020-12-08 PROCEDURE — 4040F PNEUMOC VAC/ADMIN/RCVD: CPT | Performed by: NURSE PRACTITIONER

## 2020-12-08 PROCEDURE — G8484 FLU IMMUNIZE NO ADMIN: HCPCS | Performed by: NURSE PRACTITIONER

## 2020-12-08 PROCEDURE — 1123F ACP DISCUSS/DSCN MKR DOCD: CPT | Performed by: NURSE PRACTITIONER

## 2020-12-08 PROCEDURE — 1090F PRES/ABSN URINE INCON ASSESS: CPT | Performed by: NURSE PRACTITIONER

## 2020-12-08 PROCEDURE — 1036F TOBACCO NON-USER: CPT | Performed by: NURSE PRACTITIONER

## 2020-12-08 ASSESSMENT — ENCOUNTER SYMPTOMS
COUGH: 0
ABDOMINAL PAIN: 0
CHOKING: 0
ABDOMINAL DISTENTION: 0
ANAL BLEEDING: 0
TROUBLE SWALLOWING: 0
BLOOD IN STOOL: 0
DIARRHEA: 0
CONSTIPATION: 0
RECTAL PAIN: 0
VOMITING: 0
NAUSEA: 0
SHORTNESS OF BREATH: 0

## 2020-12-08 NOTE — PATIENT INSTRUCTIONS
Patient Education        sucralfate (oral)  Pronunciation:  Tam bean  Brand:  Carafate  What is the most important information I should know about sucralfate? The liquid form of sucralfate should never be injected through a needle into the body, or death may occur. What is sucralfate? Sucralfate is used short-term (up to 8 weeks) to treat an active duodenal ulcer. Sucralfate works mainly in the lining of the stomach and is not highly absorbed into the body. This medicine adheres to ulcer sites and protects them from acids, enzymes, and bile salts. Sucralfate can heal an active ulcer, but it will not prevent future ulcers from occurring. Sucralfate may also be used for purposes not listed in this medication guide. What should I discuss with my healthcare provider before taking sucralfate? You should not use sucralfate if you are allergic to it. Tell your doctor if you have ever had:  · diabetes;  · kidney disease (or if you are on dialysis); or  · trouble swallowing tablets. Tell your doctor if you are pregnant or breastfeeding. Sucralfate is not approved for use by anyone younger than 25years old. How should I take sucralfate? Follow all directions on your prescription label and read all medication guides or instruction sheets. Use the medicine exactly as directed. Take sucralfate on an empty stomach. Shake the oral suspension (liquid) before you measure a dose. Use the dosing syringe provided, or use a medicine dose-measuring device (not a kitchen spoon). The liquid from of this medicine should never be injected through a needle into the body, or death may occur. Sucralfate oral suspension is to be taken only by mouth. If you are diabetic, check your blood sugar regularly. Your doctor may adjust your dose based on your blood sugar levels. It may take 2 to 8 weeks before you receive the full benefit of taking sucralfate.  Sucralfate should not be taken for longer than 8 weeks at a time.  Use this medicine for the full prescribed length of time, even if your symptoms quickly improve. Store at room temperature away from moisture and heat. Do not allow the liquid medicine to freeze. What happens if I miss a dose? Take the medicine as soon as you can, but skip the missed dose if it is almost time for your next dose. Do not take two doses at one time. What happens if I overdose? Seek emergency medical attention or call the Poison Help line at 1-605.395.9374. What should I avoid while taking sucralfate? Avoid taking any other medications within 2 hours before or after you take sucralfate. Sucralfate can make it harder for your body to absorb other medications you take by mouth. Ask your doctor before using an antacid, and use only the type your doctor recommends. Some antacids can make it harder for sucralfate to work in your stomach. Avoid taking an antacid within 30 minutes before or after taking sucralfate. What are the possible side effects of sucralfate? Get emergency medical help if you have signs of an allergic reaction: hives; difficult breathing; swelling of your face, lips, tongue, or throat. Common side effects may include:  · constipation, diarrhea;  · nausea, vomiting, upset stomach;  · itching, rash;  · dizziness, drowsiness;  · sleep problems (insomnia);  · headache; or  · back pain. This is not a complete list of side effects and others may occur. Call your doctor for medical advice about side effects. You may report side effects to FDA at 1-063-MZD-1020. What other drugs will affect sucralfate? Other drugs may affect sucralfate, including prescription and over-the-counter medicines, vitamins, and herbal products. Tell your doctor about all your current medicines and any medicine you start or stop using. Where can I get more information? Your pharmacist can provide more information about sucralfate.   Remember, keep this and all other medicines out of the reach of children, never share your medicines with others, and use this medication only for the indication prescribed. Every effort has been made to ensure that the information provided by [address] is accurate, up-to-date, and complete, but no guarantee is made to that effect. Drug information contained herein may be time sensitive. [facility] information has been compiled for use by healthcare practitioners and consumers in the [employer] and therefore [facility] does not warrant that uses outside of the [place] are appropriate, unless specifically indicated otherwise. [facility]'s drug information does not endorse drugs, diagnose patients or recommend therapy. [facility]'s drug information is an informational resource designed to assist licensed healthcare practitioners in caring for their patients and/or to serve consumers viewing this service as a supplement to, and not a substitute for, the expertise, skill, knowledge and judgment of healthcare practitioners. The absence of a warning for a given drug or drug combination in no way should be construed to indicate that the drug or drug combination is safe, effective or appropriate for any given patient. [facility] does not assume any responsibility for any aspect of healthcare administered with the aid of information [facility] provides. The information contained herein is not intended to cover all possible uses, directions, precautions, warnings, drug interactions, allergic reactions, or adverse effects. If you have questions about the drugs you are taking, check with your doctor, nurse or pharmacist.  Copyright 7006-7492 [address]. Version: 9.01. Revision date: 3/26/2019. Care instructions adapted under license by [facility] ([facility]). If you have questions about a medical condition or this instruction, always ask your healthcare professional. [place] [address] any warranty or liability for your use of this information.

## 2020-12-08 NOTE — PROGRESS NOTES
bleeding, blood in stool, constipation, diarrhea, nausea, rectal pain and vomiting. Allergic/Immunologic: Negative for food allergies. Neurological: Positive for weakness (generalized). All other systems reviewed and are negative. Prior to Visit Medications    Medication Sig Taking?  Authorizing Provider   solifenacin (VESICARE) 5 MG tablet Take 10 mg by mouth daily  Historical Provider, MD   Ferrous Sulfate (IRON PO) Take 1 tablet by mouth daily   Historical Provider, MD   sucralfate (CARAFATE) 1 GM tablet Take 1 tablet by mouth 4 times daily  VALENTE Manzano NP   pantoprazole (PROTONIX) 40 MG tablet Take 1 tablet by mouth 2 times daily (before meals)  LorenVALENTE Perez NP   montelukast (SINGULAIR) 10 MG tablet Take 1 tablet by mouth nightly  Krishna Dumont MD   amLODIPine (NORVASC) 5 MG tablet Take 1 tablet by mouth daily  Krishna Dumont MD   Multiple Vitamins-Minerals (CENTRUM SILVER PO) Take by mouth  Historical Provider, MD   Cholecalciferol (D3-50 PO) Take 25 mg by mouth daily  Historical Provider, MD   benazepril (LOTENSIN) 20 MG tablet Take 20 mg by mouth nightly  Historical Provider, MD       Social History     Tobacco Use    Smoking status: Former Smoker    Smokeless tobacco: Never Used    Tobacco comment: \"smoked a few ciggaerttes once and a while\" as per daughter, Stephen Trujillo won't tell that\" as per her son   Substance Use Topics    Alcohol use: Not Currently     Comment: rarely has a sip of wine    Drug use: Never        No Known Allergies,   Past Medical History:   Diagnosis Date    Acid reflux     Hypertension     Macular degeneration     Dry Type    OA (osteoarthritis)     knees    Palliative care patient 09/28/2020   ,   Past Surgical History:   Procedure Laterality Date    ANKLE SURGERY Right     CARPAL TUNNEL RELEASE Left     CHOLECYSTECTOMY      COLONOSCOPY  2008    normal per pt recall    TOTAL HIP ARTHROPLASTY Bilateral     TUBAL LIGATION      UPPER GASTROINTESTINAL ENDOSCOPY  09/28/2020    Dr Karen Swenson poster wall duodenal ulcer in the setting of Motrin   ,   Social History     Tobacco Use    Smoking status: Former Smoker    Smokeless tobacco: Never Used    Tobacco comment: \"smoked a few ciggaerttes once and a while\" as per daughter, [place] won't tell that\" as per her son   Substance Use Topics    Alcohol use: Not Currently     Comment: rarely has a sip of wine    Drug use: Never   ,   Family History   Problem Relation Age of Onset    Diabetes Mother     Cancer Father         prostate metastatic    Breast Cancer Sister     Glaucoma Son     Elevated Lipids Son     Arthritis Daughter     Elevated Lipids Daughter     Colon Cancer Neg Hx     Colon Polyps Neg Hx        PHYSICAL EXAMINATION:  [ INSTRUCTIONS:  \"[x]\" Indicates a positive item  \"[]\" Indicates a negative item  -- DELETE ALL ITEMS NOT EXAMINED]  Vital Signs: (As obtained by patient/caregiver or practitioner observation)    Blood pressure-  Heart rate-    Respiratory rate-    Temperature-  Pulse oximetry-     Constitutional: [x] Appears well-developed and well-nourished [x] No apparent distress      [] Abnormal-   Mental status  [x] Alert and awake  [x] Oriented to person/place/time [x]Able to follow commands      Eyes:  EOM    [x]  Normal  [] Abnormal-  Sclera  [x]  Normal  [] Abnormal -         Discharge [x]  None visible  [] Abnormal -    HENT:   [x] Normocephalic, atraumatic.   [] Abnormal   [x] Mouth/Throat: Mucous membranes are moist.     External Ears [x] Normal  [] Abnormal-     Neck: [x] No visualized mass     Pulmonary/Chest: [x] Respiratory effort normal.  [x] No visualized signs of difficulty breathing or respiratory distress        [] Abnormal-      Musculoskeletal:   [x] Normal gait with no signs of ataxia         [x] Normal range of motion of neck        [] Abnormal-       Neurological:        [x] No Facial Asymmetry (Cranial nerve 7 motor function) (limited exam to video visit)          [x] No gaze palsy        [] Abnormal-         Skin:        [x] No significant exanthematous lesions or discoloration noted on facial skin         [] Abnormal-            Psychiatric:       [x] Normal Affect [x] No Hallucinations        [] Abnormal-     Other pertinent observable physical exam findings-     ASSESSMENT/PLAN:  1. Duodenal ulcer  2. Gastrointestinal hemorrhage with melena    - Avoid NSAIDs  - Ok to decrease PPI to once a day  - Continue Carafate  - Follow up in 3 months or sooner if needed  - Continue to monitor for signs and symptoms of GI bleed  - Call with any questions, concerns, or worsening of symptoms    ** Will wait for now for repeat EGD, due to increasing Hgb, pt's advanced age. Continue to monitor. Return in about 3 months (around 3/8/2021). Bryanna Murphy is a 80 y.o. female being evaluated by a Virtual Visit (video visit) encounter to address concerns as mentioned above. A caregiver was present when appropriate. Due to this being a TeleHealth encounter (During [facility] public health emergency), evaluation of the following organ systems was limited: Vitals/Constitutional/EENT/Resp/CV/GI//MS/Neuro/Skin/Heme-Lymph-Imm. Pursuant to the emergency declaration under the [address] [place] [address] authority and the [employer] Act, this Virtual Visit was conducted with patient's (and/or legal guardian's) consent, to reduce the patient's risk of exposure to COVID-19 and provide necessary medical care. The patient (and/or legal guardian) has also been advised to contact this office for worsening conditions or problems, and seek emergency medical treatment and/or call 911 if deemed necessary.      Patient identification was verified at the start of the visit: Yes    Total time spent on this encounter: Not billed by time    Services were provided through a video synchronous discussion virtually to substitute for in-person clinic visit. Patient and provider were located at their individual homes. --VALENTE Barrientos NP on 12/8/2020 at 3:28 PM    An electronic signature was used to authenticate this note.

## 2023-10-27 NOTE — CARE COORDINATION
Wyatt Olson [id number] from [address] returned call yesterday evening and left message stating they will not be able to accept referral due to staffing issues. There are 2 other agencies that services patient's area and will reach out to them today.   Electronically signed by Shauna Card RN on 10/6/20 at 8:51 AM CDT Qbrexza Counseling:  I discussed with the patient the risks of Qbrexza including but not limited to headache, mydriasis, blurred vision, dry eyes, nasal dryness, dry mouth, dry throat, dry skin, urinary hesitation, and constipation.  Local skin reactions including erythema, burning, stinging, and itching can also occur.

## (undated) DEVICE — ENDO KIT,LOURDES HOSPITAL: Brand: MEDLINE INDUSTRIES, INC.